# Patient Record
Sex: MALE | Race: OTHER | HISPANIC OR LATINO
[De-identification: names, ages, dates, MRNs, and addresses within clinical notes are randomized per-mention and may not be internally consistent; named-entity substitution may affect disease eponyms.]

---

## 2017-01-04 ENCOUNTER — APPOINTMENT (OUTPATIENT)
Dept: ORTHOPEDIC SURGERY | Facility: HOSPITAL | Age: 67
End: 2017-01-04

## 2017-01-11 ENCOUNTER — APPOINTMENT (OUTPATIENT)
Dept: ORTHOPEDIC SURGERY | Facility: HOSPITAL | Age: 67
End: 2017-01-11

## 2017-03-16 ENCOUNTER — APPOINTMENT (OUTPATIENT)
Dept: INTERNAL MEDICINE | Facility: CLINIC | Age: 67
End: 2017-03-16

## 2017-03-27 ENCOUNTER — OUTPATIENT (OUTPATIENT)
Dept: OUTPATIENT SERVICES | Facility: HOSPITAL | Age: 67
LOS: 1 days | End: 2017-03-27
Payer: SELF-PAY

## 2017-03-27 ENCOUNTER — APPOINTMENT (OUTPATIENT)
Age: 67
End: 2017-03-27

## 2017-03-27 VITALS
BODY MASS INDEX: 28.61 KG/M2 | SYSTOLIC BLOOD PRESSURE: 138 MMHG | HEIGHT: 66 IN | TEMPERATURE: 97.7 F | OXYGEN SATURATION: 99 % | WEIGHT: 178 LBS | RESPIRATION RATE: 18 BRPM | HEART RATE: 69 BPM | DIASTOLIC BLOOD PRESSURE: 81 MMHG

## 2017-03-27 DIAGNOSIS — Z00.00 ENCOUNTER FOR GENERAL ADULT MEDICAL EXAMINATION WITHOUT ABNORMAL FINDINGS: ICD-10-CM

## 2017-03-27 PROCEDURE — G0463: CPT

## 2017-03-28 DIAGNOSIS — M25.511 PAIN IN RIGHT SHOULDER: ICD-10-CM

## 2017-03-28 DIAGNOSIS — E78.5 HYPERLIPIDEMIA, UNSPECIFIED: ICD-10-CM

## 2017-03-28 DIAGNOSIS — I87.2 VENOUS INSUFFICIENCY (CHRONIC) (PERIPHERAL): ICD-10-CM

## 2017-04-03 ENCOUNTER — APPOINTMENT (OUTPATIENT)
Dept: SURGERY | Facility: CLINIC | Age: 67
End: 2017-04-03

## 2017-04-03 ENCOUNTER — OUTPATIENT (OUTPATIENT)
Dept: OUTPATIENT SERVICES | Facility: HOSPITAL | Age: 67
LOS: 1 days | End: 2017-04-03
Payer: SELF-PAY

## 2017-04-03 VITALS
OXYGEN SATURATION: 99 % | TEMPERATURE: 98 F | WEIGHT: 178 LBS | DIASTOLIC BLOOD PRESSURE: 79 MMHG | HEIGHT: 66 IN | RESPIRATION RATE: 17 BRPM | SYSTOLIC BLOOD PRESSURE: 130 MMHG | BODY MASS INDEX: 28.61 KG/M2 | HEART RATE: 88 BPM

## 2017-04-03 DIAGNOSIS — I87.2 VENOUS INSUFFICIENCY (CHRONIC) (PERIPHERAL): ICD-10-CM

## 2017-04-03 DIAGNOSIS — R21 RASH AND OTHER NONSPECIFIC SKIN ERUPTION: ICD-10-CM

## 2017-04-03 DIAGNOSIS — Z78.9 OTHER SPECIFIED HEALTH STATUS: ICD-10-CM

## 2017-04-03 DIAGNOSIS — Z00.00 ENCOUNTER FOR GENERAL ADULT MEDICAL EXAMINATION WITHOUT ABNORMAL FINDINGS: ICD-10-CM

## 2017-04-03 DIAGNOSIS — I83.10 VARICOSE VEINS OF UNSPECIFIED LOWER EXTREMITY WITH INFLAMMATION: ICD-10-CM

## 2017-04-03 PROCEDURE — G0463: CPT

## 2017-07-20 ENCOUNTER — APPOINTMENT (OUTPATIENT)
Dept: INTERNAL MEDICINE | Facility: CLINIC | Age: 67
End: 2017-07-20

## 2017-07-20 ENCOUNTER — OUTPATIENT (OUTPATIENT)
Dept: OUTPATIENT SERVICES | Facility: HOSPITAL | Age: 67
LOS: 1 days | End: 2017-07-20
Payer: SELF-PAY

## 2017-07-20 VITALS
TEMPERATURE: 98.5 F | HEART RATE: 87 BPM | SYSTOLIC BLOOD PRESSURE: 124 MMHG | RESPIRATION RATE: 16 BRPM | DIASTOLIC BLOOD PRESSURE: 75 MMHG | BODY MASS INDEX: 28.61 KG/M2 | HEIGHT: 66 IN | OXYGEN SATURATION: 99 % | WEIGHT: 178 LBS

## 2017-07-20 DIAGNOSIS — N50.82 SCROTAL PAIN: ICD-10-CM

## 2017-07-20 DIAGNOSIS — Z00.00 ENCOUNTER FOR GENERAL ADULT MEDICAL EXAMINATION WITHOUT ABNORMAL FINDINGS: ICD-10-CM

## 2017-07-20 LAB
ALBUMIN SERPL ELPH-MCNC: 4.8 G/DL — SIGNIFICANT CHANGE UP (ref 3.3–5)
ALP SERPL-CCNC: 82 U/L — SIGNIFICANT CHANGE UP (ref 40–120)
ALT FLD-CCNC: 23 U/L — SIGNIFICANT CHANGE UP (ref 10–45)
ANION GAP SERPL CALC-SCNC: 12 MMOL/L — SIGNIFICANT CHANGE UP (ref 5–17)
APPEARANCE UR: CLEAR — SIGNIFICANT CHANGE UP
AST SERPL-CCNC: 22 U/L — SIGNIFICANT CHANGE UP (ref 10–40)
BASOPHILS # BLD AUTO: 0.05 K/UL — SIGNIFICANT CHANGE UP (ref 0–0.2)
BASOPHILS NFR BLD AUTO: 0.6 % — SIGNIFICANT CHANGE UP (ref 0–2)
BILIRUB SERPL-MCNC: 0.5 MG/DL — SIGNIFICANT CHANGE UP (ref 0.2–1.2)
BILIRUB UR-MCNC: NEGATIVE — SIGNIFICANT CHANGE UP
BUN SERPL-MCNC: 29 MG/DL — HIGH (ref 7–23)
CALCIUM SERPL-MCNC: 10.2 MG/DL — SIGNIFICANT CHANGE UP (ref 8.4–10.5)
CHLORIDE SERPL-SCNC: 103 MMOL/L — SIGNIFICANT CHANGE UP (ref 96–108)
CHOLEST SERPL-MCNC: 178 MG/DL — SIGNIFICANT CHANGE UP (ref 10–199)
CO2 SERPL-SCNC: 27 MMOL/L — SIGNIFICANT CHANGE UP (ref 22–31)
COLOR SPEC: YELLOW — SIGNIFICANT CHANGE UP
CREAT ?TM UR-MCNC: 190 MG/DL — SIGNIFICANT CHANGE UP
CREAT SERPL-MCNC: 1.03 MG/DL — SIGNIFICANT CHANGE UP (ref 0.5–1.3)
DIFF PNL FLD: NEGATIVE — SIGNIFICANT CHANGE UP
EOSINOPHIL # BLD AUTO: 0.09 K/UL — SIGNIFICANT CHANGE UP (ref 0–0.5)
EOSINOPHIL NFR BLD AUTO: 1.1 % — SIGNIFICANT CHANGE UP (ref 0–6)
ERYTHROCYTE [SEDIMENTATION RATE] IN BLOOD: 17 MM/HR — SIGNIFICANT CHANGE UP (ref 0–20)
FOLATE SERPL-MCNC: >20 NG/ML — SIGNIFICANT CHANGE UP (ref 4.8–24.2)
GGT SERPL-CCNC: 38 U/L — SIGNIFICANT CHANGE UP (ref 9–50)
GLUCOSE SERPL-MCNC: 93 MG/DL — SIGNIFICANT CHANGE UP (ref 70–99)
GLUCOSE UR QL: NEGATIVE MG/DL — SIGNIFICANT CHANGE UP
HBA1C BLD-MCNC: 5.6 % — SIGNIFICANT CHANGE UP (ref 4–5.6)
HCT VFR BLD CALC: 42.8 % — SIGNIFICANT CHANGE UP (ref 39–50)
HDLC SERPL-MCNC: 50 MG/DL — SIGNIFICANT CHANGE UP (ref 40–125)
HGB BLD-MCNC: 13.9 G/DL — SIGNIFICANT CHANGE UP (ref 13–17)
IMM GRANULOCYTES NFR BLD AUTO: 0.2 % — SIGNIFICANT CHANGE UP (ref 0–1.5)
IRON SATN MFR SERPL: 109 UG/DL — SIGNIFICANT CHANGE UP (ref 45–165)
IRON SATN MFR SERPL: 39 % — SIGNIFICANT CHANGE UP (ref 16–55)
KETONES UR-MCNC: NEGATIVE — SIGNIFICANT CHANGE UP
LEUKOCYTE ESTERASE UR-ACNC: NEGATIVE — SIGNIFICANT CHANGE UP
LIPID PNL WITH DIRECT LDL SERPL: 104 MG/DL — SIGNIFICANT CHANGE UP
LYMPHOCYTES # BLD AUTO: 1.96 K/UL — SIGNIFICANT CHANGE UP (ref 1–3.3)
LYMPHOCYTES # BLD AUTO: 23.6 % — SIGNIFICANT CHANGE UP (ref 13–44)
MCHC RBC-ENTMCNC: 29.3 PG — SIGNIFICANT CHANGE UP (ref 27–34)
MCHC RBC-ENTMCNC: 32.5 GM/DL — SIGNIFICANT CHANGE UP (ref 32–36)
MCV RBC AUTO: 90.3 FL — SIGNIFICANT CHANGE UP (ref 80–100)
MICROALBUMIN UR-MCNC: 1.2 MG/DL — SIGNIFICANT CHANGE UP
MICROALBUMIN/CREAT UR-RTO: 6 MG/G — SIGNIFICANT CHANGE UP (ref 0–30)
MONOCYTES # BLD AUTO: 0.48 K/UL — SIGNIFICANT CHANGE UP (ref 0–0.9)
MONOCYTES NFR BLD AUTO: 5.8 % — SIGNIFICANT CHANGE UP (ref 2–14)
NEUTROPHILS # BLD AUTO: 5.71 K/UL — SIGNIFICANT CHANGE UP (ref 1.8–7.4)
NEUTROPHILS NFR BLD AUTO: 68.7 % — SIGNIFICANT CHANGE UP (ref 43–77)
NITRITE UR-MCNC: NEGATIVE — SIGNIFICANT CHANGE UP
PH UR: 6 — SIGNIFICANT CHANGE UP (ref 5–8)
PLATELET # BLD AUTO: 157 K/UL — SIGNIFICANT CHANGE UP (ref 150–400)
POTASSIUM SERPL-MCNC: 4.2 MMOL/L — SIGNIFICANT CHANGE UP (ref 3.5–5.3)
POTASSIUM SERPL-SCNC: 4.2 MMOL/L — SIGNIFICANT CHANGE UP (ref 3.5–5.3)
PROT SERPL-MCNC: 8 G/DL — SIGNIFICANT CHANGE UP (ref 6–8.3)
PROT UR-MCNC: NEGATIVE MG/DL — SIGNIFICANT CHANGE UP
PSA FLD-MCNC: 4.2 NG/ML — HIGH (ref 0–4)
RBC # BLD: 4.74 M/UL — SIGNIFICANT CHANGE UP (ref 4.2–5.8)
RBC # FLD: 13.4 % — SIGNIFICANT CHANGE UP (ref 10.3–14.5)
SODIUM SERPL-SCNC: 142 MMOL/L — SIGNIFICANT CHANGE UP (ref 135–145)
SP GR SPEC: 1.03 — HIGH (ref 1.01–1.02)
T3 SERPL-MCNC: 122 NG/DL — SIGNIFICANT CHANGE UP (ref 80–200)
T4 FREE SERPL-MCNC: 1 NG/DL — SIGNIFICANT CHANGE UP (ref 0.9–1.8)
TIBC SERPL-MCNC: 278 UG/DL — SIGNIFICANT CHANGE UP (ref 220–430)
TOTAL CHOLESTEROL/HDL RATIO MEASUREMENT: 3.6 RATIO — SIGNIFICANT CHANGE UP (ref 3.4–9.6)
TRIGL SERPL-MCNC: 120 MG/DL — SIGNIFICANT CHANGE UP (ref 10–149)
TSH SERPL-MCNC: 3.59 UIU/ML — SIGNIFICANT CHANGE UP (ref 0.27–4.2)
UIBC SERPL-MCNC: 169 UG/DL — SIGNIFICANT CHANGE UP (ref 110–370)
UROBILINOGEN FLD QL: NEGATIVE MG/DL — SIGNIFICANT CHANGE UP
VIT B12 SERPL-MCNC: 614 PG/ML — SIGNIFICANT CHANGE UP (ref 243–894)
WBC # BLD: 8.31 K/UL — SIGNIFICANT CHANGE UP (ref 3.8–10.5)
WBC # FLD AUTO: 8.31 K/UL — SIGNIFICANT CHANGE UP (ref 3.8–10.5)

## 2017-07-20 PROCEDURE — 83550 IRON BINDING TEST: CPT

## 2017-07-20 PROCEDURE — 80061 LIPID PANEL: CPT

## 2017-07-20 PROCEDURE — 85652 RBC SED RATE AUTOMATED: CPT

## 2017-07-20 PROCEDURE — 84439 ASSAY OF FREE THYROXINE: CPT

## 2017-07-20 PROCEDURE — 80053 COMPREHEN METABOLIC PANEL: CPT

## 2017-07-20 PROCEDURE — 85027 COMPLETE CBC AUTOMATED: CPT

## 2017-07-20 PROCEDURE — 84443 ASSAY THYROID STIM HORMONE: CPT

## 2017-07-20 PROCEDURE — 82746 ASSAY OF FOLIC ACID SERUM: CPT

## 2017-07-20 PROCEDURE — 82977 ASSAY OF GGT: CPT

## 2017-07-20 PROCEDURE — 82306 VITAMIN D 25 HYDROXY: CPT

## 2017-07-20 PROCEDURE — 84480 ASSAY TRIIODOTHYRONINE (T3): CPT

## 2017-07-20 PROCEDURE — G0103: CPT

## 2017-07-20 PROCEDURE — 82043 UR ALBUMIN QUANTITATIVE: CPT

## 2017-07-20 PROCEDURE — 81003 URINALYSIS AUTO W/O SCOPE: CPT

## 2017-07-20 PROCEDURE — 82607 VITAMIN B-12: CPT

## 2017-07-20 PROCEDURE — 83036 HEMOGLOBIN GLYCOSYLATED A1C: CPT

## 2017-07-20 PROCEDURE — G0463: CPT

## 2017-07-21 LAB — 24R-OH-CALCIDIOL SERPL-MCNC: 46.6 NG/ML — SIGNIFICANT CHANGE UP (ref 30–100)

## 2017-07-24 DIAGNOSIS — N50.82 SCROTAL PAIN: ICD-10-CM

## 2017-07-24 DIAGNOSIS — E78.5 HYPERLIPIDEMIA, UNSPECIFIED: ICD-10-CM

## 2017-07-31 ENCOUNTER — OUTPATIENT (OUTPATIENT)
Dept: OUTPATIENT SERVICES | Facility: HOSPITAL | Age: 67
LOS: 1 days | End: 2017-07-31
Payer: SELF-PAY

## 2017-07-31 ENCOUNTER — APPOINTMENT (OUTPATIENT)
Dept: SURGERY | Facility: CLINIC | Age: 67
End: 2017-07-31
Payer: SELF-PAY

## 2017-07-31 VITALS
DIASTOLIC BLOOD PRESSURE: 72 MMHG | BODY MASS INDEX: 28.61 KG/M2 | WEIGHT: 178 LBS | HEIGHT: 66 IN | HEART RATE: 77 BPM | SYSTOLIC BLOOD PRESSURE: 131 MMHG

## 2017-07-31 DIAGNOSIS — Z00.00 ENCOUNTER FOR GENERAL ADULT MEDICAL EXAMINATION WITHOUT ABNORMAL FINDINGS: ICD-10-CM

## 2017-07-31 DIAGNOSIS — R21 RASH AND OTHER NONSPECIFIC SKIN ERUPTION: ICD-10-CM

## 2017-07-31 PROCEDURE — G0463: CPT

## 2017-07-31 PROCEDURE — 99212 OFFICE O/P EST SF 10 MIN: CPT

## 2017-08-02 DIAGNOSIS — I87.2 VENOUS INSUFFICIENCY (CHRONIC) (PERIPHERAL): ICD-10-CM

## 2017-08-24 ENCOUNTER — FORM ENCOUNTER (OUTPATIENT)
Age: 67
End: 2017-08-24

## 2017-08-25 ENCOUNTER — APPOINTMENT (OUTPATIENT)
Dept: ULTRASOUND IMAGING | Facility: HOSPITAL | Age: 67
End: 2017-08-25
Payer: SELF-PAY

## 2017-08-25 ENCOUNTER — OUTPATIENT (OUTPATIENT)
Dept: OUTPATIENT SERVICES | Facility: HOSPITAL | Age: 67
LOS: 1 days | End: 2017-08-25
Payer: SELF-PAY

## 2017-08-25 DIAGNOSIS — N50.82 SCROTAL PAIN: ICD-10-CM

## 2017-08-25 PROCEDURE — 76870 US EXAM SCROTUM: CPT

## 2017-08-25 PROCEDURE — 76870 US EXAM SCROTUM: CPT | Mod: 26

## 2020-02-07 ENCOUNTER — EMERGENCY (EMERGENCY)
Facility: HOSPITAL | Age: 70
LOS: 1 days | Discharge: HOME | End: 2020-02-07
Attending: EMERGENCY MEDICINE
Payer: MEDICARE

## 2020-02-07 VITALS
RESPIRATION RATE: 16 BRPM | OXYGEN SATURATION: 98 % | WEIGHT: 171.96 LBS | DIASTOLIC BLOOD PRESSURE: 89 MMHG | HEIGHT: 68 IN | SYSTOLIC BLOOD PRESSURE: 158 MMHG | HEART RATE: 79 BPM | TEMPERATURE: 98 F

## 2020-02-07 VITALS
OXYGEN SATURATION: 98 % | RESPIRATION RATE: 18 BRPM | TEMPERATURE: 98 F | DIASTOLIC BLOOD PRESSURE: 84 MMHG | SYSTOLIC BLOOD PRESSURE: 134 MMHG | HEART RATE: 80 BPM

## 2020-02-07 DIAGNOSIS — Z90.49 ACQUIRED ABSENCE OF OTHER SPECIFIED PARTS OF DIGESTIVE TRACT: Chronic | ICD-10-CM

## 2020-02-07 PROCEDURE — 99283 EMERGENCY DEPT VISIT LOW MDM: CPT

## 2020-02-07 RX ORDER — LIDOCAINE 4 G/100G
1 CREAM TOPICAL ONCE
Refills: 0 | Status: COMPLETED | OUTPATIENT
Start: 2020-02-07 | End: 2020-02-07

## 2020-02-07 RX ORDER — METHOCARBAMOL 500 MG/1
2 TABLET, FILM COATED ORAL
Qty: 30 | Refills: 0
Start: 2020-02-07 | End: 2020-02-11

## 2020-02-07 RX ORDER — IBUPROFEN 200 MG
400 TABLET ORAL ONCE
Refills: 0 | Status: DISCONTINUED | OUTPATIENT
Start: 2020-02-07 | End: 2020-02-07

## 2020-02-07 RX ORDER — IBUPROFEN 200 MG
1 TABLET ORAL
Qty: 56 | Refills: 0
Start: 2020-02-07 | End: 2020-02-20

## 2020-02-07 RX ORDER — ACETAMINOPHEN 500 MG
975 TABLET ORAL ONCE
Refills: 0 | Status: COMPLETED | OUTPATIENT
Start: 2020-02-07 | End: 2020-02-07

## 2020-02-07 RX ORDER — IBUPROFEN 200 MG
600 TABLET ORAL ONCE
Refills: 0 | Status: COMPLETED | OUTPATIENT
Start: 2020-02-07 | End: 2020-02-07

## 2020-02-07 RX ORDER — METHOCARBAMOL 500 MG/1
1500 TABLET, FILM COATED ORAL ONCE
Refills: 0 | Status: COMPLETED | OUTPATIENT
Start: 2020-02-07 | End: 2020-02-07

## 2020-02-07 RX ADMIN — METHOCARBAMOL 1500 MILLIGRAM(S): 500 TABLET, FILM COATED ORAL at 21:22

## 2020-02-07 RX ADMIN — Medication 975 MILLIGRAM(S): at 21:22

## 2020-02-07 RX ADMIN — Medication 600 MILLIGRAM(S): at 21:22

## 2020-02-07 RX ADMIN — LIDOCAINE 1 PATCH: 4 CREAM TOPICAL at 21:21

## 2020-02-07 NOTE — ED PROVIDER NOTE - CLINICAL SUMMARY MEDICAL DECISION MAKING FREE TEXT BOX
Patient presenting with right rib and lower abdominal pain after trauma to the area 3 weeks ago when bending over a pole. Suspect musculoskeletal rib pain(absence of any GI, , or infectious symptoms) of with associated muscle strain on the ipsilateral side. Will treat pain aggressively.

## 2020-02-07 NOTE — ED PROVIDER NOTE - ATTENDING CONTRIBUTION TO CARE
I was physically present for the E/M service provided. I agree with above history, physical, and plan which I have reviewed and edited where appropriate. I was physically present for the key portions of the service provided.    Miller: 70M PMH BPH, appendectomy (child), venous stasis presents with right rib pain and right lower abdominal pain/flank pain x 3 wks after leaning over a pole. pt hit affected side and since then unable to lie on that side.  pain improve with advil.  movement and lying on affected side makes it worse. no fever, no dysuria, no n/v, normal bm.      ttp at right lateral abd no crepitus, no abd ttp    a/p: muscle strain vs contusion.  robaxin, motrin, tylenol, lidoderm.

## 2020-02-07 NOTE — ED PROVIDER NOTE - OBJECTIVE STATEMENT
70M PMH BPH who presents with rib pain and right lower abdominal pain. He reports that three weeks ago he was bent over a pole and he believes that is how he injured his rib. His abdominal pain started at that same time. There are no assoca 70M PMH BPH who presents with right rib pain and right lower abdominal pain. He reports that three weeks ago he was bent over a pole and he believes that is how he injured his rib. His abdominal pain started at that same time. He has never had similar symptoms. Denies any fever, chills, n/v/d, cp, palpitations, sob, dysuria, urinary frequency(more than baseline related to BPH), changes in bowel habits or frequency . He used some type of patch for relief but got a rash. Of note, had appendix removed as a child.

## 2020-02-07 NOTE — ED PROVIDER NOTE - GASTROINTESTINAL, MLM
abdomen soft, nondistended. Mild tenderness with deep palpation of right lower abdomen, no rebound or guarding

## 2020-02-07 NOTE — ED PROVIDER NOTE - PATIENT PORTAL LINK FT
You can access the FollowMyHealth Patient Portal offered by [facility] by registering at the following website: [email or web address]. By joining [employer] [facility]’s FollowMyHealth portal, you will also be able to view your health information using other applications (apps) compatible with our system.

## 2020-02-27 PROBLEM — N40.0 BENIGN PROSTATIC HYPERPLASIA WITHOUT LOWER URINARY TRACT SYMPTOMS: Chronic | Status: ACTIVE | Noted: 2020-02-07

## 2020-03-02 ENCOUNTER — APPOINTMENT (OUTPATIENT)
Dept: SURGERY | Facility: CLINIC | Age: 70
End: 2020-03-02
Payer: MEDICARE

## 2020-03-02 VITALS
SYSTOLIC BLOOD PRESSURE: 149 MMHG | TEMPERATURE: 98.6 F | HEIGHT: 66 IN | HEART RATE: 101 BPM | DIASTOLIC BLOOD PRESSURE: 84 MMHG

## 2020-03-02 PROCEDURE — 99214 OFFICE O/P EST MOD 30 MIN: CPT

## 2020-03-02 RX ORDER — ATORVASTATIN CALCIUM 80 MG/1
TABLET, FILM COATED ORAL
Refills: 0 | Status: ACTIVE | COMMUNITY

## 2020-03-03 NOTE — CONSULT LETTER
[Consult Letter:] : I had the pleasure of evaluating your patient, [unfilled]. [Dear  ___] : Dear  [unfilled], [Consult Closing:] : Thank you very much for allowing me to participate in the care of this patient.  If you have any questions, please do not hesitate to contact me. [Please see my note below.] : Please see my note below. [Sincerely,] : Sincerely, [DrErum  ___] : Dr. ANGELO [FreeTextEntry3] : Yosi Esparza MD, FACS

## 2020-03-03 NOTE — PLAN
[FreeTextEntry1] : Mr. CATALAN  was told significance of findings, options, risks and benefits were explained.  Patient has BL inguinal hernias. LIH is slightly larger than the right and it could be because of the recent injury.   All surgical options were discussed including non-surgical treatments.  patient was advised to return for evaluation in 1 month after his ecchymosis has completely resolved. \par Patient advised to seek immediate medical attention with any acute change in symptoms or with the development of any new or worsening symptoms.  Patient's questions and concerns addressed to patient's satisfaction, and patient verbalized an understanding of the information discussed.\par \par

## 2020-03-03 NOTE — HISTORY OF PRESENT ILLNESS
[de-identified] : Mr. KARLENE CATALAN is  a 70 year old male who was referred by Dr. Manuela Collins [phone]  and Dr Elvis Whitney  with the chief complaint of having BL groin pain   He has had the condition for 6 months and is worse when he is walking or standing.  He is stating that the hernia is getting  more symptomatic. He denies any fever or  night sweats. Appetite is good and weight is stable.    Patient states he had a fall at work  recently and had a left flank and left hip injury and has a  ecchymosis. \par  \par

## 2020-05-14 ENCOUNTER — APPOINTMENT (OUTPATIENT)
Dept: SURGERY | Facility: CLINIC | Age: 70
End: 2020-05-14

## 2020-07-06 ENCOUNTER — APPOINTMENT (OUTPATIENT)
Dept: SURGERY | Facility: CLINIC | Age: 70
End: 2020-07-06
Payer: MEDICARE

## 2020-07-06 VITALS
HEIGHT: 66 IN | WEIGHT: 180.13 LBS | SYSTOLIC BLOOD PRESSURE: 156 MMHG | HEART RATE: 82 BPM | TEMPERATURE: 96.9 F | DIASTOLIC BLOOD PRESSURE: 93 MMHG | BODY MASS INDEX: 28.95 KG/M2 | OXYGEN SATURATION: 96 %

## 2020-07-06 DIAGNOSIS — Z87.19 PERSONAL HISTORY OF OTHER DISEASES OF THE DIGESTIVE SYSTEM: ICD-10-CM

## 2020-07-06 DIAGNOSIS — Z83.3 FAMILY HISTORY OF DIABETES MELLITUS: ICD-10-CM

## 2020-07-06 DIAGNOSIS — K80.20 CALCULUS OF GALLBLADDER W/OUT CHOLECYSTITIS W/OUT OBSTRUCTION: ICD-10-CM

## 2020-07-06 DIAGNOSIS — R10.31 RIGHT LOWER QUADRANT PAIN: ICD-10-CM

## 2020-07-06 DIAGNOSIS — Z87.438 PERSONAL HISTORY OF OTHER DISEASES OF MALE GENITAL ORGANS: ICD-10-CM

## 2020-07-06 DIAGNOSIS — Z87.442 PERSONAL HISTORY OF URINARY CALCULI: ICD-10-CM

## 2020-07-06 DIAGNOSIS — Z87.891 PERSONAL HISTORY OF NICOTINE DEPENDENCE: ICD-10-CM

## 2020-07-06 DIAGNOSIS — Z78.9 OTHER SPECIFIED HEALTH STATUS: ICD-10-CM

## 2020-07-06 DIAGNOSIS — Z82.49 FAMILY HISTORY OF ISCHEMIC HEART DISEASE AND OTHER DISEASES OF THE CIRCULATORY SYSTEM: ICD-10-CM

## 2020-07-06 DIAGNOSIS — Z80.0 FAMILY HISTORY OF MALIGNANT NEOPLASM OF DIGESTIVE ORGANS: ICD-10-CM

## 2020-07-06 DIAGNOSIS — R10.32 RIGHT LOWER QUADRANT PAIN: ICD-10-CM

## 2020-07-06 DIAGNOSIS — K40.20 BILATERAL INGUINAL HERNIA, W/OUT OBSTRUCTION OR GANGRENE, NOT SPECIFIED AS RECURRENT: ICD-10-CM

## 2020-07-06 PROCEDURE — 99214 OFFICE O/P EST MOD 30 MIN: CPT

## 2020-07-06 NOTE — HISTORY OF PRESENT ILLNESS
[de-identified] : Mr. Mercado presented today for evaluation and management of bilateral groin pain.  He stated the pain is worse on the right than the left.  He rated the pain an 8/10.  He stated the pain sometimes feels like a "pulling" sensation from the right lower quadrant into the groin.  He denied any bulges in the groins.

## 2020-07-06 NOTE — REVIEW OF SYSTEMS
[Abdominal Pain] : abdominal pain [Incontinence] : incontinence [Joint Pain] : joint pain [Negative] : Heme/Lymph [Vomiting] : no vomiting [Constipation] : no constipation [Diarrhea] : no diarrhea [Heartburn] : no heartburn [Melena] : no melena [Dysuria] : no dysuria [Nocturia] : no nocturia [Hesitancy] : no urinary hesitancy [Genital Lesion] : no genital lesions [Joint Swelling] : no joint swelling [Arthralgias] : no arthralgias [Testicular Pain] : no testicular pain [Limb Pain] : no limb pain [Joint Stiffness] : no joint stiffness [Limb Swelling] : no limb swelling [FreeTextEntry8] : pelvic pain

## 2020-07-06 NOTE — REASON FOR VISIT
[Consultation] : a consultation visit [Family Member] : family member [FreeTextEntry1] : Consultation requested by: Dr. Dennis Roy

## 2020-07-06 NOTE — PHYSICAL EXAM
[Calm] : calm [de-identified] : NAD, comfortable [de-identified] : NCAT, no scleral icterus [de-identified] : CTAB [de-identified] : Supple, no JVD or cervical lymphadenopathy [de-identified] : +BS soft NT ND.  No hepatosplenomegaly.  Well-healed paramidline incision to the patient's right of the umbilicus from his prior appendectomy. [de-identified] : S1S2 normal, RRR [de-identified] : No definite inguinal hernia defect identified bilaterally. [de-identified] : Warm, dry. [de-identified] : No clubbing, cyanosis, or edema. [de-identified] : A&Ox3

## 2020-07-06 NOTE — CONSULT LETTER
[FreeTextEntry1] : 2020\par \par \par \par Dennis Roy M.D.\par [address]\[place] [address]\par Telephone #: [phone]\par \par \par Re: Arley Mercado\par : 1950\par \par \par Dear Dr. Roy:\par \par I had the opportunity to see Mr. Mercado today for evaluation and management of bilateral groin pain.  He stated the pain is worse on the right than the left.  He rated the pain an 8/10.  He stated the pain sometimes feels like a "pulling" sensation from the right lower quadrant into the groin.  He denied any bulges in the groins.\par \par On physical examination, his height is 5 feet 6 inches, his weight is 180 pounds, and BMI is 29.07.  His temperature is 96.9 °F, his blood pressure is 156/93, heart rate is 82, and O2 saturation is 96% on room air.  In general, he is a well-dressed, well-nourished man who appears his stated age and is in no acute distress.  He is calm, alert and oriented x 3. HEENT exam demonstrates no scleral icterus and a normocephalic atraumatic appearance.  Neck is supple without JVD or cervical lymphadenopathy appreciated.  Lungs are clear to auscultation bilaterally.  Heart sounds S1S2 are normal with regular rate and rhythm.  His abdomen has audible bowel sounds, is soft, non-tender, and non-distended.  There is no hepatosplenomegaly.  There is a well-healed paramidline incision to the patient's right of the umbilicus from his prior appendectomy.  His extremities are warm and dry with no evidence of clubbing, cyanosis, or edema.  Bilateral groin examination demonstrates no definite inguinal hernia.\par \par I reviewed the ultrasound of the testicles that was performed on 2017, which demonstrated no testicular torsion.  Small bilateral hydroceles.\par \par In summary, Mr. Mercado is a 70-year-old man with bilateral groin pain without definite inguinal hernias on examination.  We will perform an ultrasound of the pelvis to evaluate for hernias.\par \par Thank you for the opportunity to care for this patient.  Please do not hesitate to contact me in the event that you have any questions or concerns about the care of this patient.\par \par Sincerely,\par \par \par \par \par Josefina Gonzalez M.D.\par \par CC:\par Kenney Hernández M.D.\par [facility]\par [address]\[place] [address]\par Telephone #: [phone]

## 2020-07-06 NOTE — ASSESSMENT
[FreeTextEntry1] : Mr. Mercado is a 70-year-old man with bilateral groin pain without definite inguinal hernias on examination.  We will perform an ultrasound of the pelvis to evaluate for hernias.

## 2020-07-19 ENCOUNTER — OUTPATIENT (OUTPATIENT)
Dept: OUTPATIENT SERVICES | Facility: HOSPITAL | Age: 70
LOS: 1 days | End: 2020-07-19
Payer: MEDICARE

## 2020-07-19 ENCOUNTER — APPOINTMENT (OUTPATIENT)
Dept: ULTRASOUND IMAGING | Facility: HOSPITAL | Age: 70
End: 2020-07-19

## 2020-07-19 DIAGNOSIS — Z90.49 ACQUIRED ABSENCE OF OTHER SPECIFIED PARTS OF DIGESTIVE TRACT: Chronic | ICD-10-CM

## 2020-07-19 PROCEDURE — 76705 ECHO EXAM OF ABDOMEN: CPT | Mod: 26

## 2020-07-19 PROCEDURE — 76857 US EXAM PELVIC LIMITED: CPT

## 2020-07-19 PROCEDURE — 76857 US EXAM PELVIC LIMITED: CPT | Mod: 26

## 2020-07-19 PROCEDURE — 76705 ECHO EXAM OF ABDOMEN: CPT

## 2020-08-24 ENCOUNTER — APPOINTMENT (OUTPATIENT)
Dept: DISASTER EMERGENCY | Facility: CLINIC | Age: 70
End: 2020-08-24

## 2020-08-25 LAB — SARS-COV-2 N GENE NPH QL NAA+PROBE: NOT DETECTED

## 2020-09-16 ENCOUNTER — APPOINTMENT (OUTPATIENT)
Dept: HEART AND VASCULAR | Facility: CLINIC | Age: 70
End: 2020-09-16
Payer: MEDICARE

## 2020-09-16 VITALS
SYSTOLIC BLOOD PRESSURE: 126 MMHG | OXYGEN SATURATION: 98 % | DIASTOLIC BLOOD PRESSURE: 80 MMHG | BODY MASS INDEX: 29.05 KG/M2 | TEMPERATURE: 97.5 F | WEIGHT: 180 LBS

## 2020-09-16 DIAGNOSIS — I83.90 ASYMPTOMATIC VARICOSE VEINS OF UNSPECIFIED LOWER EXTREMITY: ICD-10-CM

## 2020-09-16 PROCEDURE — 93000 ELECTROCARDIOGRAM COMPLETE: CPT

## 2020-09-16 PROCEDURE — 99204 OFFICE O/P NEW MOD 45 MIN: CPT

## 2020-09-16 RX ORDER — ASPIRIN 325 MG/1
TABLET, FILM COATED ORAL
Refills: 0 | Status: DISCONTINUED | COMMUNITY
End: 2020-09-16

## 2020-09-16 NOTE — HISTORY OF PRESENT ILLNESS
[FreeTextEntry1] : sent by pcp for cardiac evaluation\par since Lockdown has noted increase fatigue\par eoisides with stairs and hills\par vague chest heaviness, non radiationg\par mild\par better rest, last less than 1 minute\par +sob

## 2020-09-16 NOTE — PHYSICAL EXAM
[General Appearance - Well Developed] : well developed [Normal Appearance] : normal appearance [Well Groomed] : well groomed [General Appearance - Well Nourished] : well nourished [No Deformities] : no deformities [General Appearance - In No Acute Distress] : no acute distress [Eyelids - No Xanthelasma] : the eyelids demonstrated no xanthelasmas [Normal Conjunctiva] : the conjunctiva exhibited no abnormalities [Normal Oral Mucosa] : normal oral mucosa [No Oral Cyanosis] : no oral cyanosis [No Oral Pallor] : no oral pallor [Normal Jugular Venous A Waves Present] : normal jugular venous A waves present [Normal Jugular Venous V Waves Present] : normal jugular venous V waves present [No Jugular Venous Connors A Waves] : no jugular venous connors A waves [Respiration, Rhythm And Depth] : normal respiratory rhythm and effort [Exaggerated Use Of Accessory Muscles For Inspiration] : no accessory muscle use [Auscultation Breath Sounds / Voice Sounds] : lungs were clear to auscultation bilaterally [Heart Sounds] : normal S1 and S2 [Heart Rate And Rhythm] : heart rate and rhythm were normal [Murmurs] : no murmurs present [Abdomen Tenderness] : non-tender [Abdomen Soft] : soft [Abdomen Mass (___ Cm)] : no abdominal mass palpated [Abnormal Walk] : normal gait [Cyanosis, Localized] : no localized cyanosis [Nail Clubbing] : no clubbing of the fingernails [Gait - Sufficient For Exercise Testing] : the gait was sufficient for exercise testing [Petechial Hemorrhages (___cm)] : no petechial hemorrhages [] : no ischemic changes [FreeTextEntry1] : varicosities L + stasis [Oriented To Time, Place, And Person] : oriented to person, place, and time [Affect] : the affect was normal [Mood] : the mood was normal [No Anxiety] : not feeling anxious

## 2020-09-24 ENCOUNTER — APPOINTMENT (OUTPATIENT)
Dept: HEART AND VASCULAR | Facility: CLINIC | Age: 70
End: 2020-09-24
Payer: MEDICARE

## 2020-09-24 DIAGNOSIS — E78.5 HYPERLIPIDEMIA, UNSPECIFIED: ICD-10-CM

## 2020-09-24 DIAGNOSIS — R07.9 CHEST PAIN, UNSPECIFIED: ICD-10-CM

## 2020-09-24 DIAGNOSIS — R06.02 SHORTNESS OF BREATH: ICD-10-CM

## 2020-09-24 PROCEDURE — 93306 TTE W/DOPPLER COMPLETE: CPT

## 2020-09-24 PROCEDURE — 99214 OFFICE O/P EST MOD 30 MIN: CPT | Mod: 25

## 2020-09-24 PROCEDURE — 78451 HT MUSCLE IMAGE SPECT SING: CPT

## 2020-09-24 PROCEDURE — 93015 CV STRESS TEST SUPVJ I&R: CPT

## 2020-09-24 PROCEDURE — A9500: CPT

## 2020-09-24 NOTE — PHYSICAL EXAM
[General Appearance - Well Developed] : well developed [Normal Appearance] : normal appearance [Well Groomed] : well groomed [General Appearance - Well Nourished] : well nourished [No Deformities] : no deformities [General Appearance - In No Acute Distress] : no acute distress [Normal Conjunctiva] : the conjunctiva exhibited no abnormalities [Eyelids - No Xanthelasma] : the eyelids demonstrated no xanthelasmas [Normal Oral Mucosa] : normal oral mucosa [No Oral Pallor] : no oral pallor [No Oral Cyanosis] : no oral cyanosis [Normal Jugular Venous A Waves Present] : normal jugular venous A waves present [Normal Jugular Venous V Waves Present] : normal jugular venous V waves present [No Jugular Venous Connors A Waves] : no jugular venous connors A waves [Respiration, Rhythm And Depth] : normal respiratory rhythm and effort [Exaggerated Use Of Accessory Muscles For Inspiration] : no accessory muscle use [Auscultation Breath Sounds / Voice Sounds] : lungs were clear to auscultation bilaterally [Heart Rate And Rhythm] : heart rate and rhythm were normal [Heart Sounds] : normal S1 and S2 [Murmurs] : no murmurs present [Abdomen Soft] : soft [Abdomen Tenderness] : non-tender [Abdomen Mass (___ Cm)] : no abdominal mass palpated [Abnormal Walk] : normal gait [Gait - Sufficient For Exercise Testing] : the gait was sufficient for exercise testing [Nail Clubbing] : no clubbing of the fingernails [Cyanosis, Localized] : no localized cyanosis [Petechial Hemorrhages (___cm)] : no petechial hemorrhages [] : no ischemic changes [FreeTextEntry1] : varicosities L + stasis [Oriented To Time, Place, And Person] : oriented to person, place, and time [Affect] : the affect was normal [Mood] : the mood was normal [No Anxiety] : not feeling anxious

## 2020-09-24 NOTE — DISCUSSION/SUMMARY
[FreeTextEntry1] : CP/SOB- negative non invasive cardiac evaluation, results discussed, reassurance\par Lipids- cont statin

## 2020-10-16 ENCOUNTER — APPOINTMENT (OUTPATIENT)
Dept: VASCULAR SURGERY | Facility: CLINIC | Age: 70
End: 2020-10-16
Payer: MEDICARE

## 2020-10-16 DIAGNOSIS — I87.2 VENOUS INSUFFICIENCY (CHRONIC) (PERIPHERAL): ICD-10-CM

## 2020-10-16 PROCEDURE — 93971 EXTREMITY STUDY: CPT

## 2020-10-16 PROCEDURE — 99203 OFFICE O/P NEW LOW 30 MIN: CPT

## 2020-10-16 NOTE — ASSESSMENT
[FreeTextEntry1] : 69 y/o M presents for initial evaluation of LLE varicose veins. On exam, LLE: Bulging varicose veins over the medial calf. LLE venous doppler done at the office today: Negative DVT/SVT, deep reflux CFV - Femoral vein- POP vein. SSV reflux at SPJ to mid calf. Discussed LLE RFA followed by LLE stab phlebectomy to which pt agrees and accepts. The risks, benefits, convalescence and alternatives were reviewed. Numerous questions were asked and answered. Preop medical clearance.Surgery will be scheduled at a convenient time. In the interim, we will seek authorization from insurance, once approved we will proceed accordingly.

## 2020-10-16 NOTE — PROCEDURE
[FreeTextEntry1] : LLE venous doppler done at the office today: Negative DVT/SVT, deep reflux CFV - Femoral vein- POP vein. SSV reflux at SPJ to mid calf

## 2020-10-16 NOTE — HISTORY OF PRESENT ILLNESS
[FreeTextEntry1] :  70 year old male presenting to the office for evaluation of LL varicose veins. Patient noticed swelling, heaviness and achiness on the leg. Patient has been wearing compression stockings with no improvement and elevating legs after work and symptoms do not seem to improve. \par Denies: rest pain, new ulcerations, skin changes.

## 2020-10-16 NOTE — ADDENDUM
[FreeTextEntry1] : This note was written by Evita Flood on 10/16/2020 acting as scribe for Delgado Graves M.D.\par \par I, Delgado Waldron have read and attest that all the information, medical decision making and discharge instructions within are true and accurate.

## 2020-10-16 NOTE — PHYSICAL EXAM
[Respiratory Effort] : normal respiratory effort [Normal Rate and Rhythm] : normal rate and rhythm [2+] : left 2+ [Varicose Veins Of Lower Extremities] : present [Varicose Veins Of The Left Leg] : of the left leg [Ankle Swelling On The Left] : moderate [Alert] : alert [Oriented to Place] : oriented to place [Oriented to Person] : oriented to person [Oriented to Time] : oriented to time [Calm] : calm [] : not present [Ankle Swelling (On Exam)] : not present [Abdomen Tenderness] : ~T ~M No abdominal tenderness [de-identified] : Calm, cooperative [de-identified] : Supple [de-identified] : NC/AT [de-identified] : FROM [de-identified] : LLE: Bulging varicose veins over the medial calf.

## 2020-11-16 ENCOUNTER — APPOINTMENT (OUTPATIENT)
Dept: VASCULAR SURGERY | Facility: CLINIC | Age: 70
End: 2020-11-16
Payer: MEDICARE

## 2020-11-16 PROCEDURE — 99072 ADDL SUPL MATRL&STAF TM PHE: CPT

## 2020-11-16 PROCEDURE — 36475 ENDOVENOUS RF 1ST VEIN: CPT | Mod: LT

## 2020-11-16 NOTE — PROCEDURE
[FreeTextEntry1] : LLE RFA [FreeTextEntry2] : LLE SSV reflux.  [FreeTextEntry3] : Surgeon: Karan Mcgarry MD\par \par Assistant: Neyda Reed RVT\par \par Pre-Op Diagnosis:  Incompetent Left Small Saphenous Vein\par \par Post-Op Diagnosis:  Same\par \par Procedure:  Transcatheter Occlusion of  Left Small Saphenous Vein using a Radio- Frequency Thermal Ablation (VNUS) ClosureFAST Catheter.\par \par Anesthesia: Local \par \par History: Symptomatic varicose veins\par \par Findings:  Complete occlusion of greater saphenous vein at end of procedure.\par \par Procedure: The patient’s leg was prepped and draped.  Under local 1% Lidocaine the greater saphenous vein was punctured below the knee with a micropuncture needle and then the guidewire was inserted into the vein.  This was performed under ultrasound guidance.  The skin was incised with a #11 Blade, a dilator was inserted and then the 7 Fr. Introducer was placed into the greater saphenous vein.  \par \par The fossa ovalis was visualized with the Duplex scan.  The common femoral vein was confirmed to be patent.  Duplex examination of the greater saphenous vein from the calf to the groin was performed.  The greater saphenous and inferior epigastric vein were identified and the VNUS catheter was introduced through the introducer and into the vein up to the fossa ovalis.  It was positioned 3 cm distal to the inferior epigastric vein.\par \par Tumescent solution (400 cc normal saline, 4cc of 8.4% Sodium bicarbonate and 40 cc 1% Lidoaine) was infiltrated subfascially over the vein.  \par \par The VNUS ClosureFAST catheter checked for proper function.  Thermal ablation was begun after the position of the catheter was once again confirmed to be 3 cm distal to the inferior epigastric branch of the greater saphenous vein. The proximal 7 cm was treated twice and then the rest of the vein was treated with single 20 sec cycles. The sheath and catheter were removed. Compression was applied for hemostasis.    \par \par Confirmation of common femoral vein patency and occlusion of the greater saphenous vein was made with duplex ultrasonography.  \par \par The leg was wrapped with gauze and Ace Bandages.  The patient remained on the table until alert and was then comfortable ambulating.           \par \par Patient will return in 2-3 days for repeat ultrasound to make sure GSV is closed and to check for heat induced thrombus.

## 2020-11-16 NOTE — ADDENDUM
[FreeTextEntry1] : This note was written by Evita Flood on 11/16/2020 acting as scribe for Delgado Graves M.D.\par \par I, Delgado Waldron have read and attest that all the information, medical decision making and discharge instructions within are true and accurate.

## 2020-11-20 ENCOUNTER — APPOINTMENT (OUTPATIENT)
Dept: VASCULAR SURGERY | Facility: CLINIC | Age: 70
End: 2020-11-20
Payer: MEDICARE

## 2020-11-20 PROCEDURE — 93971 EXTREMITY STUDY: CPT

## 2020-12-18 ENCOUNTER — APPOINTMENT (OUTPATIENT)
Dept: VASCULAR SURGERY | Facility: CLINIC | Age: 70
End: 2020-12-18
Payer: MEDICARE

## 2020-12-18 PROCEDURE — 99072 ADDL SUPL MATRL&STAF TM PHE: CPT

## 2020-12-18 PROCEDURE — 99213 OFFICE O/P EST LOW 20 MIN: CPT

## 2020-12-21 NOTE — PHYSICAL EXAM
[Respiratory Effort] : normal respiratory effort [Normal Rate and Rhythm] : normal rate and rhythm [2+] : left 2+ [Varicose Veins Of Lower Extremities] : present [Varicose Veins Of The Left Leg] : of the left leg [Ankle Swelling On The Right] : mild [Alert] : alert [Oriented to Person] : oriented to person [Oriented to Place] : oriented to place [Oriented to Time] : oriented to time [Calm] : calm [Ankle Swelling (On Exam)] : not present [] : not present [Abdomen Tenderness] : ~T ~M No abdominal tenderness [de-identified] : Well appearing  [de-identified] : NC/AT  [de-identified] : FROM 5/5 x 4.  [de-identified] : bulging varicose veins over LLE

## 2020-12-21 NOTE — ASSESSMENT
[FreeTextEntry1] : 69 y/o M with LLE SSV reflux s/p RFA presents for follow up evaluation. Pt continues to experience discomfort and bulging varicose veins interfere with his daily activities.  Given residual LLE symptomatic varicose veins, discussed LLE stab phlebectomy to be done at [facility], to which patient has accepted and agreed to proceed. The risks, benefits, convalescence and alternatives were reviewed. Numerous questions were asked and answered to his satisfaction. \par \par Plan: \par - LLE stab phlebectomy at [facility]\par - Will seek authorization from his insurance, once approved will contact patient to schedule procedure.  [Arterial/Venous Disease] : arterial/venous disease

## 2020-12-21 NOTE — ADDENDUM
[FreeTextEntry1] : This note was written by Evita Flood on 12/18/2020 acting as scribe for Delgado Graves M.D.\par \par I, Delgado Waldron have read and attest that all the information, medical decision making and discharge instructions within are true and accurate.

## 2021-01-15 ENCOUNTER — OUTPATIENT (OUTPATIENT)
Dept: OUTPATIENT SERVICES | Facility: HOSPITAL | Age: 71
LOS: 1 days | End: 2021-01-15
Payer: MEDICARE

## 2021-01-15 DIAGNOSIS — Z01.818 ENCOUNTER FOR OTHER PREPROCEDURAL EXAMINATION: ICD-10-CM

## 2021-01-15 DIAGNOSIS — Z90.49 ACQUIRED ABSENCE OF OTHER SPECIFIED PARTS OF DIGESTIVE TRACT: Chronic | ICD-10-CM

## 2021-01-15 LAB
ALBUMIN SERPL ELPH-MCNC: 5 G/DL — SIGNIFICANT CHANGE UP (ref 3.3–5)
ALP SERPL-CCNC: 97 U/L — SIGNIFICANT CHANGE UP (ref 40–120)
ALT FLD-CCNC: 36 U/L — SIGNIFICANT CHANGE UP (ref 10–45)
ANION GAP SERPL CALC-SCNC: 10 MMOL/L — SIGNIFICANT CHANGE UP (ref 5–17)
APTT BLD: 31.2 SEC — SIGNIFICANT CHANGE UP (ref 27.5–35.5)
AST SERPL-CCNC: 23 U/L — SIGNIFICANT CHANGE UP (ref 10–40)
BASOPHILS # BLD AUTO: 0.05 K/UL — SIGNIFICANT CHANGE UP (ref 0–0.2)
BASOPHILS NFR BLD AUTO: 0.7 % — SIGNIFICANT CHANGE UP (ref 0–2)
BILIRUB SERPL-MCNC: 0.7 MG/DL — SIGNIFICANT CHANGE UP (ref 0.2–1.2)
BUN SERPL-MCNC: 17 MG/DL — SIGNIFICANT CHANGE UP (ref 7–23)
CALCIUM SERPL-MCNC: 9.8 MG/DL — SIGNIFICANT CHANGE UP (ref 8.4–10.5)
CHLORIDE SERPL-SCNC: 102 MMOL/L — SIGNIFICANT CHANGE UP (ref 96–108)
CO2 SERPL-SCNC: 31 MMOL/L — SIGNIFICANT CHANGE UP (ref 22–31)
CREAT SERPL-MCNC: 0.91 MG/DL — SIGNIFICANT CHANGE UP (ref 0.5–1.3)
EOSINOPHIL # BLD AUTO: 0.05 K/UL — SIGNIFICANT CHANGE UP (ref 0–0.5)
EOSINOPHIL NFR BLD AUTO: 0.7 % — SIGNIFICANT CHANGE UP (ref 0–6)
GLUCOSE SERPL-MCNC: 106 MG/DL — HIGH (ref 70–99)
HCT VFR BLD CALC: 48.9 % — SIGNIFICANT CHANGE UP (ref 39–50)
HGB BLD-MCNC: 15.8 G/DL — SIGNIFICANT CHANGE UP (ref 13–17)
IMM GRANULOCYTES NFR BLD AUTO: 0.3 % — SIGNIFICANT CHANGE UP (ref 0–1.5)
INR BLD: 1.02 — SIGNIFICANT CHANGE UP (ref 0.88–1.16)
LYMPHOCYTES # BLD AUTO: 2.53 K/UL — SIGNIFICANT CHANGE UP (ref 1–3.3)
LYMPHOCYTES # BLD AUTO: 35.9 % — SIGNIFICANT CHANGE UP (ref 13–44)
MCHC RBC-ENTMCNC: 29.6 PG — SIGNIFICANT CHANGE UP (ref 27–34)
MCHC RBC-ENTMCNC: 32.3 GM/DL — SIGNIFICANT CHANGE UP (ref 32–36)
MCV RBC AUTO: 91.6 FL — SIGNIFICANT CHANGE UP (ref 80–100)
MONOCYTES # BLD AUTO: 0.4 K/UL — SIGNIFICANT CHANGE UP (ref 0–0.9)
MONOCYTES NFR BLD AUTO: 5.7 % — SIGNIFICANT CHANGE UP (ref 2–14)
NEUTROPHILS # BLD AUTO: 3.99 K/UL — SIGNIFICANT CHANGE UP (ref 1.8–7.4)
NEUTROPHILS NFR BLD AUTO: 56.7 % — SIGNIFICANT CHANGE UP (ref 43–77)
NRBC # BLD: 0 /100 WBCS — SIGNIFICANT CHANGE UP (ref 0–0)
PLATELET # BLD AUTO: 154 K/UL — SIGNIFICANT CHANGE UP (ref 150–400)
POTASSIUM SERPL-MCNC: 4.6 MMOL/L — SIGNIFICANT CHANGE UP (ref 3.5–5.3)
POTASSIUM SERPL-SCNC: 4.6 MMOL/L — SIGNIFICANT CHANGE UP (ref 3.5–5.3)
PROT SERPL-MCNC: 7.7 G/DL — SIGNIFICANT CHANGE UP (ref 6–8.3)
PROTHROM AB SERPL-ACNC: 12.2 SEC — SIGNIFICANT CHANGE UP (ref 10.6–13.6)
RBC # BLD: 5.34 M/UL — SIGNIFICANT CHANGE UP (ref 4.2–5.8)
RBC # FLD: 13.1 % — SIGNIFICANT CHANGE UP (ref 10.3–14.5)
SODIUM SERPL-SCNC: 143 MMOL/L — SIGNIFICANT CHANGE UP (ref 135–145)
WBC # BLD: 7.04 K/UL — SIGNIFICANT CHANGE UP (ref 3.8–10.5)
WBC # FLD AUTO: 7.04 K/UL — SIGNIFICANT CHANGE UP (ref 3.8–10.5)

## 2021-01-15 PROCEDURE — 93010 ELECTROCARDIOGRAM REPORT: CPT

## 2021-01-15 PROCEDURE — 85610 PROTHROMBIN TIME: CPT

## 2021-01-15 PROCEDURE — 85025 COMPLETE CBC W/AUTO DIFF WBC: CPT

## 2021-01-15 PROCEDURE — 93005 ELECTROCARDIOGRAM TRACING: CPT

## 2021-01-15 PROCEDURE — 80053 COMPREHEN METABOLIC PANEL: CPT

## 2021-01-15 PROCEDURE — 85730 THROMBOPLASTIN TIME PARTIAL: CPT

## 2021-01-16 ENCOUNTER — APPOINTMENT (OUTPATIENT)
Dept: DISASTER EMERGENCY | Facility: CLINIC | Age: 71
End: 2021-01-16

## 2021-01-16 DIAGNOSIS — Z01.818 ENCOUNTER FOR OTHER PREPROCEDURAL EXAMINATION: ICD-10-CM

## 2021-01-17 LAB — SARS-COV-2 N GENE NPH QL NAA+PROBE: NOT DETECTED

## 2021-01-18 ENCOUNTER — TRANSCRIPTION ENCOUNTER (OUTPATIENT)
Age: 71
End: 2021-01-18

## 2021-01-19 ENCOUNTER — OUTPATIENT (OUTPATIENT)
Dept: OUTPATIENT SERVICES | Facility: HOSPITAL | Age: 71
LOS: 1 days | Discharge: HOME | End: 2021-01-19
Payer: MEDICARE

## 2021-01-19 ENCOUNTER — APPOINTMENT (OUTPATIENT)
Dept: VASCULAR SURGERY | Facility: HOSPITAL | Age: 71
End: 2021-01-19

## 2021-01-19 DIAGNOSIS — Z90.49 ACQUIRED ABSENCE OF OTHER SPECIFIED PARTS OF DIGESTIVE TRACT: Chronic | ICD-10-CM

## 2021-01-19 PROCEDURE — 37766 PHLEB VEINS - EXTREM 20+: CPT | Mod: LT,GC

## 2021-02-08 ENCOUNTER — APPOINTMENT (OUTPATIENT)
Dept: VASCULAR SURGERY | Facility: CLINIC | Age: 71
End: 2021-02-08
Payer: COMMERCIAL

## 2021-02-08 PROCEDURE — 99072 ADDL SUPL MATRL&STAF TM PHE: CPT

## 2021-02-08 PROCEDURE — 99212 OFFICE O/P EST SF 10 MIN: CPT

## 2021-02-08 NOTE — ASSESSMENT
[FreeTextEntry1] : 70 y/o M with PMH of varicose veins, LLE SSV reflux s/p RFA with residual bulging painful varicose vein s/p LLE stab phlebectomy 1/19/2021 presents for follow up evaluation. Pt very happy with the result. Reassured pt his LLE swelling and pulling sensation will gradually resolve over time, if he experiences pain to take Tylenol or Aleve. No vascular surgery interventions indicated at this time. He will continue to f/u with us here PRN.

## 2021-02-08 NOTE — HISTORY OF PRESENT ILLNESS
[FreeTextEntry1] : 70 y/o M with PMH of varicose veins, LLE SSV reflux s/p RFA with residual bulging painful varicose vein s/p LLE stab phlebectomy 1/19/2021 presents for follow up evaluation. Pt reports feeling well overall, he is happy with the outcome. He does report noticing mild swelling to the calf and has been experiencing L calf "pulling" sensation but does not interfere with his daily activities. Denies any skin breakdown, skin discoloration,  fever or chills.

## 2021-02-08 NOTE — ADDENDUM
[FreeTextEntry1] : This note was written by Evita Flood on 02/08/2021 acting as scribe for Delgado Graves M.D.\par \par I, Delgado Waldron have read and attest that all the information, medical decision making and discharge instructions within are true and accurate.

## 2021-02-08 NOTE — PHYSICAL EXAM
[Respiratory Effort] : normal respiratory effort [Normal Rate and Rhythm] : normal rate and rhythm [Ankle Swelling (On Exam)] : present [Ankle Swelling On The Left] : of the left ankle [Ankle Swelling On The Right] : mild [Alert] : alert [Oriented to Person] : oriented to person [Oriented to Place] : oriented to place [Oriented to Time] : oriented to time [Calm] : calm [Varicose Veins Of Lower Extremities] : not present [] : not present [Abdomen Tenderness] : ~T ~M No abdominal tenderness [de-identified] : Well appearing  [de-identified] : NC/AT  [de-identified] : Supple  [de-identified] : FROM [de-identified] : L calf mild swelling, no evidence of varicose veins, no palpable cords throughout.

## 2021-03-01 ENCOUNTER — APPOINTMENT (OUTPATIENT)
Dept: PULMONOLOGY | Facility: CLINIC | Age: 71
End: 2021-03-01
Payer: COMMERCIAL

## 2021-03-01 VITALS
BODY MASS INDEX: 28.99 KG/M2 | DIASTOLIC BLOOD PRESSURE: 84 MMHG | HEIGHT: 66 IN | TEMPERATURE: 98.1 F | OXYGEN SATURATION: 96 % | HEART RATE: 79 BPM | SYSTOLIC BLOOD PRESSURE: 138 MMHG | WEIGHT: 180.38 LBS

## 2021-03-01 PROCEDURE — 71046 X-RAY EXAM CHEST 2 VIEWS: CPT

## 2021-03-01 PROCEDURE — 99203 OFFICE O/P NEW LOW 30 MIN: CPT | Mod: 25

## 2021-03-01 PROCEDURE — 99072 ADDL SUPL MATRL&STAF TM PHE: CPT

## 2021-03-01 RX ORDER — ASPIRIN 81 MG/1
81 TABLET, COATED ORAL
Qty: 90 | Refills: 0 | Status: ACTIVE | COMMUNITY
Start: 2020-08-10

## 2021-03-02 ENCOUNTER — APPOINTMENT (OUTPATIENT)
Dept: UROLOGY | Facility: CLINIC | Age: 71
End: 2021-03-02
Payer: MEDICARE

## 2021-03-02 VITALS — HEART RATE: 70 BPM | TEMPERATURE: 97.2 F | DIASTOLIC BLOOD PRESSURE: 85 MMHG | SYSTOLIC BLOOD PRESSURE: 154 MMHG

## 2021-03-02 PROCEDURE — 99204 OFFICE O/P NEW MOD 45 MIN: CPT

## 2021-03-02 PROCEDURE — 99072 ADDL SUPL MATRL&STAF TM PHE: CPT

## 2021-03-02 NOTE — ASSESSMENT
[FreeTextEntry1] : elevated PSA\par abrnomal MRI\par to bring in disc\par PSA today\par UA UCX today\par refer for likely fusion biopsy\par \par BPH\par increase to flomax 0.8\par will f/u after

## 2021-03-02 NOTE — HISTORY OF PRESENT ILLNESS
[FreeTextEntry1] : 71M generally healthy referred with elevated PSA. previously following with another urologist - on oxybutynin 5mg qd and tamsuolisn 0.4 qhs. severe LUTs. nocturia 4-5. no double voding. no hematurai. +frequency. +urgency. slow FOS. PSA ?3.5.\par no additional complaints.\par no family history prostate kdiney bladder cancer\par MRI prostate - 82 gram gland\par PIRADS 4 lesion on report 1.1cm noted

## 2021-03-02 NOTE — PHYSICAL EXAM
[Urethral Meatus] : meatus normal [Penis Abnormality] : normal uncircumcised penis [Urinary Bladder Findings] : the bladder was normal on palpation [Scrotum] : the scrotum was normal [Epididymis] : the epididymides were normal [Testes Tenderness] : no tenderness of the testes [Testes Mass (___cm)] : there were no testicular masses [No Prostate Nodules] : no prostate nodules [Prostate Size ___ gm] : prostate size [unfilled] gm

## 2021-03-03 LAB
APPEARANCE: CLEAR
BACTERIA UR CULT: NORMAL
BACTERIA: NEGATIVE
BILIRUBIN URINE: NEGATIVE
BLOOD URINE: NEGATIVE
COLOR: NORMAL
GLUCOSE QUALITATIVE U: NEGATIVE
HYALINE CASTS: 0 /LPF
KETONES URINE: NEGATIVE
LEUKOCYTE ESTERASE URINE: NEGATIVE
MICROSCOPIC-UA: NORMAL
NITRITE URINE: NEGATIVE
PH URINE: 6.5
PROTEIN URINE: NEGATIVE
PSA FREE FLD-MCNC: 17 %
PSA FREE SERPL-MCNC: 0.86 NG/ML
PSA SERPL-MCNC: 4.99 NG/ML
RED BLOOD CELLS URINE: 2 /HPF
SPECIFIC GRAVITY URINE: 1.02
SQUAMOUS EPITHELIAL CELLS: 0 /HPF
UROBILINOGEN URINE: NORMAL
WHITE BLOOD CELLS URINE: 0 /HPF

## 2021-03-06 ENCOUNTER — APPOINTMENT (OUTPATIENT)
Dept: DISASTER EMERGENCY | Facility: CLINIC | Age: 71
End: 2021-03-06

## 2021-03-06 DIAGNOSIS — Z01.818 ENCOUNTER FOR OTHER PREPROCEDURAL EXAMINATION: ICD-10-CM

## 2021-03-07 LAB — SARS-COV-2 N GENE NPH QL NAA+PROBE: NOT DETECTED

## 2021-03-08 ENCOUNTER — APPOINTMENT (OUTPATIENT)
Dept: UROLOGY | Facility: CLINIC | Age: 71
End: 2021-03-08
Payer: COMMERCIAL

## 2021-03-08 ENCOUNTER — OUTPATIENT (OUTPATIENT)
Dept: OUTPATIENT SERVICES | Facility: HOSPITAL | Age: 71
LOS: 1 days | End: 2021-03-08
Payer: MEDICARE

## 2021-03-08 VITALS — TEMPERATURE: 97.2 F | HEART RATE: 81 BPM | SYSTOLIC BLOOD PRESSURE: 154 MMHG | DIASTOLIC BLOOD PRESSURE: 85 MMHG

## 2021-03-08 DIAGNOSIS — Z90.49 ACQUIRED ABSENCE OF OTHER SPECIFIED PARTS OF DIGESTIVE TRACT: Chronic | ICD-10-CM

## 2021-03-08 DIAGNOSIS — R35.1 BENIGN PROSTATIC HYPERPLASIA WITH LOWER URINARY TRACT SYMPMS: ICD-10-CM

## 2021-03-08 DIAGNOSIS — N40.1 BENIGN PROSTATIC HYPERPLASIA WITH LOWER URINARY TRACT SYMPMS: ICD-10-CM

## 2021-03-08 LAB
BASOPHILS # BLD AUTO: 0.07 K/UL
BASOPHILS NFR BLD AUTO: 1.1 %
EOSINOPHIL # BLD AUTO: 0.05 K/UL
EOSINOPHIL NFR BLD AUTO: 0.8 %
HCT VFR BLD CALC: 44.2 %
HGB BLD-MCNC: 14.4 G/DL
IMM GRANULOCYTES NFR BLD AUTO: 0.5 %
LYMPHOCYTES # BLD AUTO: 2.14 K/UL
LYMPHOCYTES NFR BLD AUTO: 33.9 %
MAN DIFF?: NORMAL
MCHC RBC-ENTMCNC: 29.4 PG
MCHC RBC-ENTMCNC: 32.6 GM/DL
MCV RBC AUTO: 90.2 FL
MONOCYTES # BLD AUTO: 0.44 K/UL
MONOCYTES NFR BLD AUTO: 7 %
NEUTROPHILS # BLD AUTO: 3.59 K/UL
NEUTROPHILS NFR BLD AUTO: 56.7 %
PLATELET # BLD AUTO: 169 K/UL
RBC # BLD: 4.9 M/UL
RBC # FLD: 12.9 %
WBC # FLD AUTO: 6.32 K/UL

## 2021-03-08 PROCEDURE — 71046 X-RAY EXAM CHEST 2 VIEWS: CPT

## 2021-03-08 PROCEDURE — 99214 OFFICE O/P EST MOD 30 MIN: CPT | Mod: 57

## 2021-03-08 PROCEDURE — 71046 X-RAY EXAM CHEST 2 VIEWS: CPT | Mod: 26

## 2021-03-08 PROCEDURE — 99072 ADDL SUPL MATRL&STAF TM PHE: CPT

## 2021-03-08 NOTE — HISTORY OF PRESENT ILLNESS
[FreeTextEntry1] : 70yo male with h/o elevated PSA, prior negative biopsy years ago, had recent MRI showing PI-RADS 4 lesion. Here to discuss MRI fusion biopsy. \par \par MRI report: 82gm prostate, Lesion 1: PI-RADS 4 lesion right peripheral zone, Lesion 2: Pi-RADS 3 lesion left apex [Urinary Urgency] : urinary urgency [Urinary Frequency] : urinary frequency [Weak Stream] : weak stream [None] : None

## 2021-03-08 NOTE — REASON FOR VISIT
[Follow-up Visit ___] : a follow-up visit  for [unfilled] [[employer] ] : provided by [employer]

## 2021-03-08 NOTE — ASSESSMENT
[FreeTextEntry1] : 72yo male with elevated PSA in 4-5 range\par Reviewed MRI imaging and report\par recommend MRI fusion biopsy under sedation \par Reviewed transperineal biopsy procedure, risks and benefits\par Medical clearance

## 2021-03-09 ENCOUNTER — APPOINTMENT (OUTPATIENT)
Dept: PULMONOLOGY | Facility: CLINIC | Age: 71
End: 2021-03-09
Payer: MEDICARE

## 2021-03-09 LAB
ALBUMIN SERPL ELPH-MCNC: 4.7 G/DL
ALP BLD-CCNC: 96 U/L
ALT SERPL-CCNC: 36 U/L
ANION GAP SERPL CALC-SCNC: 10 MMOL/L
APPEARANCE: CLEAR
APTT BLD: 32 SEC
AST SERPL-CCNC: 26 U/L
BACTERIA UR CULT: NORMAL
BACTERIA: NEGATIVE
BILIRUB SERPL-MCNC: 0.4 MG/DL
BILIRUBIN URINE: NEGATIVE
BLOOD URINE: NORMAL
BUN SERPL-MCNC: 20 MG/DL
CALCIUM SERPL-MCNC: 9.9 MG/DL
CHLORIDE SERPL-SCNC: 103 MMOL/L
CO2 SERPL-SCNC: 28 MMOL/L
COLOR: YELLOW
CREAT SERPL-MCNC: 0.89 MG/DL
GLUCOSE QUALITATIVE U: NEGATIVE
GLUCOSE SERPL-MCNC: 86 MG/DL
HYALINE CASTS: 1 /LPF
INR PPP: 1.02 RATIO
KETONES URINE: NEGATIVE
LEUKOCYTE ESTERASE URINE: NEGATIVE
MICROSCOPIC-UA: NORMAL
NITRITE URINE: NEGATIVE
PH URINE: 7
POTASSIUM SERPL-SCNC: 4.5 MMOL/L
PROT SERPL-MCNC: 7.4 G/DL
PROTEIN URINE: NEGATIVE
PSA SERPL-MCNC: 4.51 NG/ML
PT BLD: 12 SEC
RED BLOOD CELLS URINE: 9 /HPF
SODIUM SERPL-SCNC: 142 MMOL/L
SPECIFIC GRAVITY URINE: 1.02
SQUAMOUS EPITHELIAL CELLS: 0 /HPF
UROBILINOGEN URINE: NORMAL
WHITE BLOOD CELLS URINE: 0 /HPF

## 2021-03-09 PROCEDURE — 94060 EVALUATION OF WHEEZING: CPT

## 2021-03-09 PROCEDURE — 99072 ADDL SUPL MATRL&STAF TM PHE: CPT

## 2021-03-09 PROCEDURE — 94727 GAS DIL/WSHOT DETER LNG VOL: CPT

## 2021-03-09 PROCEDURE — 94729 DIFFUSING CAPACITY: CPT

## 2021-03-09 NOTE — ASSESSMENT
[FreeTextEntry1] : Data reviewed:\par \par PA/lat CXR in office 03/01/2021 : clear lungs\par \par Impression:\par Asymptomatic man w occupational dust exposure\par \par Plan:\par Will bring him back to check baseline PFT, but no evident disease from today's evaluation.\par --\par PFT 3/9/21: entirely normal, no BD response

## 2021-03-09 NOTE — HISTORY OF PRESENT ILLNESS
[TextBox_4] : 03/01/2021: PI [id number]. Referred here by his daughter who works at hospital. 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 He denies dyspnea, cough or sputum. He smoked a few cigarettes, maybe one a day, from 20-50. He is retired. He stays home due to pandemic. He exercises a little. Had first Covid vaccine Feb 12.\par

## 2021-03-19 DIAGNOSIS — Z01.818 ENCOUNTER FOR OTHER PREPROCEDURAL EXAMINATION: ICD-10-CM

## 2021-03-20 ENCOUNTER — APPOINTMENT (OUTPATIENT)
Dept: DISASTER EMERGENCY | Facility: CLINIC | Age: 71
End: 2021-03-20

## 2021-03-21 LAB — SARS-COV-2 N GENE NPH QL NAA+PROBE: NOT DETECTED

## 2021-03-22 ENCOUNTER — TRANSCRIPTION ENCOUNTER (OUTPATIENT)
Age: 71
End: 2021-03-22

## 2021-03-23 ENCOUNTER — OUTPATIENT (OUTPATIENT)
Dept: OUTPATIENT SERVICES | Facility: HOSPITAL | Age: 71
LOS: 1 days | Discharge: HOME | End: 2021-03-23
Payer: MEDICARE

## 2021-03-23 ENCOUNTER — APPOINTMENT (OUTPATIENT)
Dept: UROLOGY | Facility: AMBULATORY SURGERY CENTER | Age: 71
End: 2021-03-23

## 2021-03-23 ENCOUNTER — RESULT REVIEW (OUTPATIENT)
Age: 71
End: 2021-03-23

## 2021-03-23 DIAGNOSIS — Z90.49 ACQUIRED ABSENCE OF OTHER SPECIFIED PARTS OF DIGESTIVE TRACT: Chronic | ICD-10-CM

## 2021-03-23 PROCEDURE — 88305 TISSUE EXAM BY PATHOLOGIST: CPT | Mod: 26

## 2021-03-23 PROCEDURE — 55706 BX PRST8 NDL SAT SAMPLING: CPT | Mod: 22

## 2021-03-23 PROCEDURE — 76872 US TRANSRECTAL: CPT | Mod: 26

## 2021-03-23 PROCEDURE — 76942 ECHO GUIDE FOR BIOPSY: CPT | Mod: 26,59

## 2021-03-24 ENCOUNTER — EMERGENCY (EMERGENCY)
Facility: HOSPITAL | Age: 71
LOS: 1 days | Discharge: HOME | End: 2021-03-24
Attending: STUDENT IN AN ORGANIZED HEALTH CARE EDUCATION/TRAINING PROGRAM
Payer: MEDICARE

## 2021-03-24 VITALS
DIASTOLIC BLOOD PRESSURE: 89 MMHG | HEIGHT: 68 IN | TEMPERATURE: 99 F | OXYGEN SATURATION: 94 % | SYSTOLIC BLOOD PRESSURE: 166 MMHG | WEIGHT: 179.9 LBS | RESPIRATION RATE: 18 BRPM | HEART RATE: 99 BPM

## 2021-03-24 DIAGNOSIS — Z90.49 ACQUIRED ABSENCE OF OTHER SPECIFIED PARTS OF DIGESTIVE TRACT: Chronic | ICD-10-CM

## 2021-03-24 LAB
ALBUMIN SERPL ELPH-MCNC: 4.4 G/DL — SIGNIFICANT CHANGE UP (ref 3.5–5)
ALP SERPL-CCNC: 103 U/L — SIGNIFICANT CHANGE UP (ref 40–120)
ALT FLD-CCNC: 41 U/L DA — SIGNIFICANT CHANGE UP (ref 10–60)
ANION GAP SERPL CALC-SCNC: 6 MMOL/L — SIGNIFICANT CHANGE UP (ref 5–17)
APPEARANCE UR: ABNORMAL
APTT BLD: 28.8 SEC — SIGNIFICANT CHANGE UP (ref 27.5–35.5)
AST SERPL-CCNC: 21 U/L — SIGNIFICANT CHANGE UP (ref 10–40)
BACTERIA # UR AUTO: ABNORMAL /HPF
BASOPHILS # BLD AUTO: 0.05 K/UL — SIGNIFICANT CHANGE UP (ref 0–0.2)
BASOPHILS NFR BLD AUTO: 0.4 % — SIGNIFICANT CHANGE UP (ref 0–2)
BILIRUB SERPL-MCNC: 0.5 MG/DL — SIGNIFICANT CHANGE UP (ref 0.2–1.2)
BILIRUB UR-MCNC: NEGATIVE — SIGNIFICANT CHANGE UP
BUN SERPL-MCNC: 17 MG/DL — SIGNIFICANT CHANGE UP (ref 7–18)
CALCIUM SERPL-MCNC: 9.3 MG/DL — SIGNIFICANT CHANGE UP (ref 8.4–10.5)
CHLORIDE SERPL-SCNC: 101 MMOL/L — SIGNIFICANT CHANGE UP (ref 96–108)
CO2 SERPL-SCNC: 29 MMOL/L — SIGNIFICANT CHANGE UP (ref 22–31)
COLOR SPEC: ABNORMAL
CREAT SERPL-MCNC: 1.06 MG/DL — SIGNIFICANT CHANGE UP (ref 0.5–1.3)
DIFF PNL FLD: ABNORMAL
EOSINOPHIL # BLD AUTO: 0.08 K/UL — SIGNIFICANT CHANGE UP (ref 0–0.5)
EOSINOPHIL NFR BLD AUTO: 0.7 % — SIGNIFICANT CHANGE UP (ref 0–6)
GLUCOSE SERPL-MCNC: 175 MG/DL — HIGH (ref 70–99)
GLUCOSE UR QL: NEGATIVE — SIGNIFICANT CHANGE UP
HCT VFR BLD CALC: 44 % — SIGNIFICANT CHANGE UP (ref 39–50)
HGB BLD-MCNC: 14.4 G/DL — SIGNIFICANT CHANGE UP (ref 13–17)
IMM GRANULOCYTES NFR BLD AUTO: 1 % — SIGNIFICANT CHANGE UP (ref 0–1.5)
INR BLD: 1.07 RATIO — SIGNIFICANT CHANGE UP (ref 0.88–1.16)
KETONES UR-MCNC: NEGATIVE — SIGNIFICANT CHANGE UP
LACTATE SERPL-SCNC: 1.8 MMOL/L — SIGNIFICANT CHANGE UP (ref 0.7–2)
LEUKOCYTE ESTERASE UR-ACNC: ABNORMAL
LIDOCAIN IGE QN: 165 U/L — SIGNIFICANT CHANGE UP (ref 73–393)
LYMPHOCYTES # BLD AUTO: 0.97 K/UL — LOW (ref 1–3.3)
LYMPHOCYTES # BLD AUTO: 8.5 % — LOW (ref 13–44)
MCHC RBC-ENTMCNC: 29.1 PG — SIGNIFICANT CHANGE UP (ref 27–34)
MCHC RBC-ENTMCNC: 32.7 GM/DL — SIGNIFICANT CHANGE UP (ref 32–36)
MCV RBC AUTO: 89.1 FL — SIGNIFICANT CHANGE UP (ref 80–100)
MONOCYTES # BLD AUTO: 0.36 K/UL — SIGNIFICANT CHANGE UP (ref 0–0.9)
MONOCYTES NFR BLD AUTO: 3.2 % — SIGNIFICANT CHANGE UP (ref 2–14)
NEUTROPHILS # BLD AUTO: 9.83 K/UL — HIGH (ref 1.8–7.4)
NEUTROPHILS NFR BLD AUTO: 86.2 % — HIGH (ref 43–77)
NITRITE UR-MCNC: NEGATIVE — SIGNIFICANT CHANGE UP
NRBC # BLD: 0 /100 WBCS — SIGNIFICANT CHANGE UP (ref 0–0)
PH UR: 6.5 — SIGNIFICANT CHANGE UP (ref 5–8)
PLATELET # BLD AUTO: 159 K/UL — SIGNIFICANT CHANGE UP (ref 150–400)
POTASSIUM SERPL-MCNC: 4.1 MMOL/L — SIGNIFICANT CHANGE UP (ref 3.5–5.3)
POTASSIUM SERPL-SCNC: 4.1 MMOL/L — SIGNIFICANT CHANGE UP (ref 3.5–5.3)
PROT SERPL-MCNC: 8.3 G/DL — SIGNIFICANT CHANGE UP (ref 6–8.3)
PROT UR-MCNC: 100
PROTHROM AB SERPL-ACNC: 12.7 SEC — SIGNIFICANT CHANGE UP (ref 10.6–13.6)
RBC # BLD: 4.94 M/UL — SIGNIFICANT CHANGE UP (ref 4.2–5.8)
RBC # FLD: 12.7 % — SIGNIFICANT CHANGE UP (ref 10.3–14.5)
RBC CASTS # UR COMP ASSIST: >50 /HPF (ref 0–2)
SODIUM SERPL-SCNC: 136 MMOL/L — SIGNIFICANT CHANGE UP (ref 135–145)
SP GR SPEC: 1.01 — SIGNIFICANT CHANGE UP (ref 1.01–1.02)
UROBILINOGEN FLD QL: NEGATIVE — SIGNIFICANT CHANGE UP
WBC # BLD: 11.4 K/UL — HIGH (ref 3.8–10.5)
WBC # FLD AUTO: 11.4 K/UL — HIGH (ref 3.8–10.5)
WBC UR QL: SIGNIFICANT CHANGE UP /HPF (ref 0–5)

## 2021-03-24 PROCEDURE — 83605 ASSAY OF LACTIC ACID: CPT

## 2021-03-24 PROCEDURE — 80053 COMPREHEN METABOLIC PANEL: CPT

## 2021-03-24 PROCEDURE — 87086 URINE CULTURE/COLONY COUNT: CPT

## 2021-03-24 PROCEDURE — 83690 ASSAY OF LIPASE: CPT

## 2021-03-24 PROCEDURE — 85610 PROTHROMBIN TIME: CPT

## 2021-03-24 PROCEDURE — 85025 COMPLETE CBC W/AUTO DIFF WBC: CPT

## 2021-03-24 PROCEDURE — 81001 URINALYSIS AUTO W/SCOPE: CPT

## 2021-03-24 PROCEDURE — 99285 EMERGENCY DEPT VISIT HI MDM: CPT

## 2021-03-24 PROCEDURE — 36415 COLL VENOUS BLD VENIPUNCTURE: CPT

## 2021-03-24 PROCEDURE — 99284 EMERGENCY DEPT VISIT MOD MDM: CPT | Mod: 25

## 2021-03-24 PROCEDURE — 85730 THROMBOPLASTIN TIME PARTIAL: CPT

## 2021-03-24 PROCEDURE — 96374 THER/PROPH/DIAG INJ IV PUSH: CPT

## 2021-03-24 RX ORDER — IOHEXOL 300 MG/ML
30 INJECTION, SOLUTION INTRAVENOUS ONCE
Refills: 0 | Status: COMPLETED | OUTPATIENT
Start: 2021-03-24 | End: 2021-03-24

## 2021-03-24 RX ORDER — MORPHINE SULFATE 50 MG/1
6 CAPSULE, EXTENDED RELEASE ORAL ONCE
Refills: 0 | Status: DISCONTINUED | OUTPATIENT
Start: 2021-03-24 | End: 2021-03-24

## 2021-03-24 RX ADMIN — MORPHINE SULFATE 6 MILLIGRAM(S): 50 CAPSULE, EXTENDED RELEASE ORAL at 23:59

## 2021-03-24 RX ADMIN — IOHEXOL 30 MILLILITER(S): 300 INJECTION, SOLUTION INTRAVENOUS at 23:59

## 2021-03-24 NOTE — ED PROVIDER NOTE - CLINICAL SUMMARY MEDICAL DECISION MAKING FREE TEXT BOX
Patient presenting with history of urinary retention after surgery yesterday. Bedside ultrasound showing approximately 1L of urine in bladder. Attempting to contact private  doctor. Patient stable will reassess. Patient presenting with history of urinary retention after surgery yesterday. Bedside ultrasound showing approximately 1L of urine in bladder. Attempting to contact private  doctor. Patient stable will reassess.    Attempts to contact pt's  doc unsuccessful, spoke with  on call Dr Farias who rec placing coude. Catheter placed easily w/o complication and 1,000cc returned. S/p jhaveri placement pt monitored for 1 hour and states that he is now feeling well with no pain or other symptoms and requesting d/c. Rec pt to call  in the morning. DC with leg bag. Discussed with pt my clinical impression and results, patient given strict return precautions if persistent or worsening of symptoms occurs, and need for close follow up. Pt expressed understanding and agrees with plan. Pt is well appearing with a reassuring exam. Discharge home with PMD or Specialist f/u within 5 days.

## 2021-03-24 NOTE — ED PROVIDER NOTE - PATIENT PORTAL LINK FT
You can access the FollowMyHealth Patient Portal offered by [facility] by registering at the following website: [email or web address]. By joining [employer]’s FollowMyHealth portal, you will also be able to view your health information using other applications (apps) compatible with our system.

## 2021-03-24 NOTE — ED PROVIDER NOTE - OBJECTIVE STATEMENT
72 y/o male with history of appendectomy, and BPH, s/p biopsy yesterday (does not have biopsy results), denies any other past medical problems, presenting with 1 day of difficulty urinating and bloody urine after prostate surgery. Patient states that he was initially able to urinate and over the last day has not been able to fully void. Patient had surgery with Dr. Harvey with no known complications. Patient denies fever, penile discharge, nausea, vomiting, diarrhea, constipation, chest pain, shortness of breath, and any other recent illnesses or hospitalizations. NKDA.

## 2021-03-24 NOTE — ED PROVIDER NOTE - NSFOLLOWUPINSTRUCTIONS_ED_ALL_ED_FT
Hoy lo vieron en la giovani de emergencias por retención urinaria después de un procedimiento de próstata. Llame a wesley cirujano de Urología por la mañana para informarle de esta complicación y obtener la próxima loy disponible.    Llame a wesley médico de cabecera para informarle de esta visita a la giovani de emergencias y obtenga la próxima loy disponible dentro de los próximos 5 días. Cordova comentamos, regrese a la giovani de emergencias si tiene algún síntoma que empeora.  Ya no creemos que necesite más atención de emergencia o admisión al hospital en denita momento.    Si brittany hemos determinado que actualmente está estable para el jag, sabemos que las cosas pueden cambiar. Busque atención médica inmediata o regrese a la giovani de emergencias si experimenta alguno de los siguientes:   Cualquier síntoma que empeore o persista   Dolor severo   Dolor de pecho   Respiración dificultosa   Sangrado   Desmayarse   Erupción severa   Incapacidad para comer o beber   Fiebre persistente    Consulte a un médico de atención primaria o un especialista en un plazo de 5 días para asegurarse de que está mejorando.    Llame a los números de teléfono de [facility] que figuran en denita documento si tiene algún problema para obtener elisha loy de seguimiento.    ¡Te deseo lo mejor! -Dr Ashraf

## 2021-03-24 NOTE — ED PROVIDER NOTE - NSFOLLOWUPCLINICS_GEN_ALL_ED_FT
[facility]  Urology  [address] [place] [address]  Phone: [phone]  Fax: [phone]  Follow Up Time: Urgent

## 2021-03-24 NOTE — ED ADULT TRIAGE NOTE - CHIEF COMPLAINT QUOTE
c/o bloody urine /bilateral groin pain /s/p prostate procedure done  yesterday @ [facility] as per daughter

## 2021-03-24 NOTE — ED PROVIDER NOTE - PHYSICAL EXAMINATION
Vital Signs Reviewed  GEN: Uncomfortable, NAD, AAOx3  HEENT: NCAT, MMM, Neck Supple  RESP: CTAB, No rales/rhonchi/wheezing  CV: RRR, S1S2, No murmurs  ABD: Suprapubic tenderness and fullness without guarding, Soft, ND, No masses, No CVA Tenderness  Extrem/Skin: Equal pulses bilat, No cyanosis/edema/rashes  Neuro: No focal deficits   : blood at tip of penis with no lesion noted.

## 2021-03-25 ENCOUNTER — NON-APPOINTMENT (OUTPATIENT)
Age: 71
End: 2021-03-25

## 2021-03-25 LAB
CULTURE RESULTS: NO GROWTH — SIGNIFICANT CHANGE UP
SPECIMEN SOURCE: SIGNIFICANT CHANGE UP
SURGICAL PATHOLOGY STUDY: SIGNIFICANT CHANGE UP

## 2021-03-25 NOTE — ED ADULT NURSE NOTE - OBJECTIVE STATEMENT
pt presents to ED with c/o of bloody urine /bilateral groin pain /s/p prostate procedure done  yesterday @ [facility] as per daughter. Denies chest pain, sob and N/V.

## 2021-03-29 ENCOUNTER — APPOINTMENT (OUTPATIENT)
Dept: UROLOGY | Facility: CLINIC | Age: 71
End: 2021-03-29
Payer: COMMERCIAL

## 2021-03-29 VITALS — HEART RATE: 80 BPM | TEMPERATURE: 97.5 F | DIASTOLIC BLOOD PRESSURE: 78 MMHG | SYSTOLIC BLOOD PRESSURE: 131 MMHG

## 2021-03-29 PROCEDURE — 51702 INSERT TEMP BLADDER CATH: CPT | Mod: 58

## 2021-03-29 PROCEDURE — 99072 ADDL SUPL MATRL&STAF TM PHE: CPT

## 2021-03-29 NOTE — ASSESSMENT
[FreeTextEntry1] : 71 year old male who developed urinary retention after fusion transperineal biopsy\par -Catheter removed today intact without difficulty\par -voided 50 cc after catheter removal, unable to void after\par -PVR was 410 cc\par -continue with Tamsulosin and stop oxybutynin\par -pathology discussed with patient and his wife\par \par \par RTC on Friday for catheter removal

## 2021-03-29 NOTE — REVIEW OF SYSTEMS
Anesthesia Assessment: Preoperative EQUATION     Planned Procedure: Procedure(s) (LRB):  RESECTION,NEOPLASM,BLADDER,TRANSURETHRAL (N/A)  Requested Anesthesia Type:General  Surgeon: Jus Marroquin MD  Service: Urology  Known or anticipated Date of Surgery:8/27/2018     Surgeon notes: 8/22 Appt with Dr Shah     Electronic QUestionnaire Assessment completed via nurse interview with patient.      NO AQ     Triage considerations:      The patient has no apparent active cardiac condition (No unstable coronary Syndrome such as severe unstable angina or recent [<1 month] myocardial infarction, decompensated CHF, severe valvular   disease or significant arrhythmia)     Previous anesthesia records:GETA and Complications noted      10/16/2017  Anesthesia Hx:  Hx of Anesthetic complications Pt states she has trouble with memory and sedation for weeks after receiving propofol.   History of prior surgery of interest to airway management or planning:  Denies Personal Hx of Anesthesia complications.   Airway/Jaw/Neck:  Airway Findings: Mouth Opening: Normal Tongue: Normal  General Airway Assessment: Adult  Mallampati: II  TM Distance: Normal, at least 6 cm       Placement Date: 10/16/17 Placement Time: 1153 Method of Intubation: Direct laryngoscopy Inserted by: CRNA Airway Device: Endotracheal Tube Mask Ventilation: Easy Intubated: Postinduction Blade: Bryan #2 Airway Device Size: 7.5 Style: Cuffed Inflation Amount (mL): 7 Placement Verified By: Auscultation;Capnometry;ETT Condensation Grade: Grade II Complicating Factors: Anterior larynx Findings Post-Intubation: Positive EtCO2;Bilateral breath sounds;Atraumatic/Condition of teeth unchanged Depth of Insertion (cm): 22 Secured at: Lips Complications: None Breath Sounds: Equal Bilateral Insertion attempts (enter comment if more than 2 attempts): 2 Removal Date: 10/16/17 Removal Time: 1240  Placement Date: 10/16/17 Placement Time: 1153 Method of Intubation: Direct laryngoscopy  Inserted by: CRNA Airway Device: Endotracheal Tube Airway Device Size: 7.5 Style: Cuffed Depth of Insertion (cm): 22 Inflation Amount (mL): 7 Placement Verified By: Auscultation;Capnometry;ETT Condensation Breath Sounds: Equal Bilateral Insertion attempts (enter comment if more than 2 attempts): 2 Removal Date: 10/16/17 Removal Time: 1240     Subspecialty notes: Cardiology: General, Endocrinology, Hematology/Oncology, Rheumatology     Other important co-morbidities: PONV, Hypothyroidism, IBD,GERD, Osteoporosis, DDD, HX DVT-Patient denied. Stated she has stent behind R knee due to a puncture wound. S/P Colon Bleed-2/2017     Tests already available:  Available tests,  within 3 months , within [facility] .  7/2018 CBC, Iron and TIBC, Ferritin   6/2018 EKG, 2-D Echo   MCOT Monitor                            Instructions given. (See in Nurse's note)     Optimization:  Anesthesia Preop Clinic Assessment  Indicated-not required for this surgery. Had anesthesia within a year    Medical Opinion Indicated-ENT Statement of Optimization                            Sub-specialist consult indicated: Cardiology                                        Plan:    Testing:  BMP                            Consultation:Statement of Optimization from ENT,  Cardiology Clearance  Appt on 8/22/18 Cardiology                           Patient  has previously scheduled Medical Appointment: 8/22 Dr Shah     Navigation: Tests Scheduled. 8/22                        Results will be tracked by Preop Clinic.     Patient had an infection in her left nostril. Do not use left nostril for anything.     Lennie Ruiz RN                          Electronically signed by Lennie Ruiz RN at 8/20/2018 11:06 AM                                                                                                                08/20/2018  Selam Botello is a 74 y.o., female.    Pre-op Assessment         Review of Systems  Cardiovascular:  Disorder of  Cardiac Rhythm  Other Cardiac Conditions Atrial tachycardia  Hepatic/GI:  Esophageal / Stomach Disorders Gerd  Bowel Conditions:  Irritable Bowel Syndrome    Musculoskeletal:   Arthritis   Joint Disease:  Arthritis, Osteoarthritis  Bone Disorders: Osteoporosis  Spine Disorders: Degenerative disease and Disc disease    Neurological:  Osteoarthritis    Endocrine:  Thyroid Disease Hypothyroidism               [see HPI] : see HPI [Negative] : Musculoskeletal

## 2021-03-29 NOTE — HISTORY OF PRESENT ILLNESS
[FreeTextEntry1] : This is a 71 year old male who underwent fusion transperineal biopsy on 3/23/21.  \par No problems after biopsy but the next day patient was unable to urinate and [place] [facility] Catheter was placed and 1000 cc drained from bladder\par \par Doing well with catheter, clear yellow urine seen in bag today\par Currently on Tamsulosin 0.8 mg and Oxybutynin ER 5 mg\par \par \par Presents today for a trial void\par

## 2021-03-29 NOTE — PHYSICAL EXAM
[General Appearance - Well Developed] : well developed [General Appearance - Well Nourished] : well nourished [Normal Appearance] : normal appearance [Well Groomed] : well groomed [General Appearance - In No Acute Distress] : no acute distress [Abdomen Soft] : soft [Abdomen Tenderness] : non-tender [Costovertebral Angle Tenderness] : no ~M costovertebral angle tenderness [Oriented To Time, Place, And Person] : oriented to person, place, and time [Affect] : the affect was normal [Mood] : the mood was normal [Not Anxious] : not anxious [Normal Station and Gait] : the gait and station were normal for the patient's age [No Focal Deficits] : no focal deficits [FreeTextEntry1] : uncircumcised penis with some penile swelling present

## 2021-04-02 ENCOUNTER — APPOINTMENT (OUTPATIENT)
Dept: UROLOGY | Facility: CLINIC | Age: 71
End: 2021-04-02
Payer: COMMERCIAL

## 2021-04-02 VITALS — SYSTOLIC BLOOD PRESSURE: 151 MMHG | HEART RATE: 76 BPM | DIASTOLIC BLOOD PRESSURE: 74 MMHG | TEMPERATURE: 98.3 F

## 2021-04-02 DIAGNOSIS — R33.9 RETENTION OF URINE, UNSPECIFIED: ICD-10-CM

## 2021-04-02 PROCEDURE — 99024 POSTOP FOLLOW-UP VISIT: CPT

## 2021-04-02 NOTE — HISTORY OF PRESENT ILLNESS
[FreeTextEntry1] : This is a 71 year old male who underwent fusion transperineal biopsy on 3/23/21. \par No problems after biopsy but the next day patient was unable to urinate and went to [facility].\par Catheter was placed and 1000 cc drained from bladder\par \susana Has been on Tamsulosin 0.8 mg and stopped Oxybutynin\par Failed void trial on 3/29/21 and required catheter to be placed.\par \susana Presents today for a trial void.

## 2021-04-02 NOTE — ASSESSMENT
[FreeTextEntry1] : 71 year old male who developed urinary retention after fusion transperineal biopsy\par -passed void trial\par -discussed s/s to monitor for with patient\par \par RTC on monday to discuss pathology and determine next steps

## 2021-04-02 NOTE — PHYSICAL EXAM
[General Appearance - Well Developed] : well developed [General Appearance - Well Nourished] : well nourished [Normal Appearance] : normal appearance [Well Groomed] : well groomed [General Appearance - In No Acute Distress] : no acute distress [Abdomen Soft] : soft [Abdomen Tenderness] : non-tender [Costovertebral Angle Tenderness] : no ~M costovertebral angle tenderness [] : no respiratory distress [Respiration, Rhythm And Depth] : normal respiratory rhythm and effort [Exaggerated Use Of Accessory Muscles For Inspiration] : no accessory muscle use [Oriented To Time, Place, And Person] : oriented to person, place, and time [Affect] : the affect was normal [Mood] : the mood was normal [Not Anxious] : not anxious [Normal Station and Gait] : the gait and station were normal for the patient's age [FreeTextEntry1] : improved penile edema

## 2021-04-05 ENCOUNTER — APPOINTMENT (OUTPATIENT)
Dept: UROLOGY | Facility: CLINIC | Age: 71
End: 2021-04-05

## 2021-04-05 ENCOUNTER — APPOINTMENT (OUTPATIENT)
Dept: UROLOGY | Facility: CLINIC | Age: 71
End: 2021-04-05
Payer: MEDICARE

## 2021-04-05 VITALS
WEIGHT: 180 LBS | HEART RATE: 90 BPM | HEIGHT: 66 IN | TEMPERATURE: 97.9 F | SYSTOLIC BLOOD PRESSURE: 130 MMHG | BODY MASS INDEX: 28.93 KG/M2 | DIASTOLIC BLOOD PRESSURE: 77 MMHG

## 2021-04-05 PROCEDURE — 99215 OFFICE O/P EST HI 40 MIN: CPT

## 2021-04-05 PROCEDURE — 99072 ADDL SUPL MATRL&STAF TM PHE: CPT

## 2021-04-06 NOTE — HISTORY OF PRESENT ILLNESS
[FreeTextEntry1] : 72yo male with h/o elevated PSA, prior negative biopsy years ago, had recent MRI showing PI-RADS 4 lesion. Here to discuss MRI fusion biopsy. \par \par MRI report: 82gm prostate, Lesion 1: PI-RADS 4 lesion right peripheral zone, Lesion 2: Pi-RADS 3 lesion left apex\par \par 4/05/21 Here for f/u. Underwent MRI fusion biopsy 3/23. Developed postop urinary retention but passed TOV last week.\par Path: 3/14 cores positive, [place] 3+4, 30% core involved. All positive cores on left side. \par He has significant LUTS at baseline. No ED. [Urinary Urgency] : urinary urgency [Urinary Frequency] : urinary frequency [Nocturia] : nocturia [Straining] : straining [Weak Stream] : weak stream [None] : None

## 2021-04-06 NOTE — ASSESSMENT
[FreeTextEntry1] : 70yo male with newly diagnosed cT1c, Mayi 3+4 PCa, PSA 4.5\par Discussed biopsy findings with patient and treatment options\par Patient is considered favorable intermediate risk by NCCN guidelines. \par Standard treatment options including watchful waiting, active surveillance, radical prostatectomy or radiation therapy were discussed. \par Radiation can be given as brachytherapy or external beam \par Discussed alternative treatment options including focal therapy.\par Radical prostatectomy is often performed by robot assisted laparoscopic technique. Discussed immediate risks of surgery including bleeding, infection, blood clot or injury to surrounding organs. Discussed long term risks including urinary incontinence and sexual dysfunction. \par Discussed risks of radiation treatment including urinary and bowel symptoms and sexual dysfunction. \par Occitan-language handout given on treatment options\par He will discuss all options with his family and call us back when he decides \par

## 2021-04-11 DIAGNOSIS — Z01.818 ENCOUNTER FOR OTHER PREPROCEDURAL EXAMINATION: ICD-10-CM

## 2021-04-12 ENCOUNTER — APPOINTMENT (OUTPATIENT)
Dept: DISASTER EMERGENCY | Facility: CLINIC | Age: 71
End: 2021-04-12

## 2021-04-12 ENCOUNTER — APPOINTMENT (OUTPATIENT)
Dept: UROLOGY | Facility: CLINIC | Age: 71
End: 2021-04-12

## 2021-04-13 LAB
APPEARANCE: CLEAR
BACTERIA: NEGATIVE
BILIRUBIN URINE: NEGATIVE
BLOOD URINE: ABNORMAL
COLOR: NORMAL
GLUCOSE QUALITATIVE U: NEGATIVE
HYALINE CASTS: 1 /LPF
KETONES URINE: NEGATIVE
LEUKOCYTE ESTERASE URINE: ABNORMAL
MICROSCOPIC-UA: NORMAL
NITRITE URINE: NEGATIVE
PH URINE: 6.5
PROTEIN URINE: NEGATIVE
RED BLOOD CELLS URINE: 3 /HPF
SARS-COV-2 N GENE NPH QL NAA+PROBE: NOT DETECTED
SPECIFIC GRAVITY URINE: 1.01
SQUAMOUS EPITHELIAL CELLS: 0 /HPF
UROBILINOGEN URINE: NORMAL
WHITE BLOOD CELLS URINE: 6 /HPF

## 2021-04-14 ENCOUNTER — TRANSCRIPTION ENCOUNTER (OUTPATIENT)
Age: 71
End: 2021-04-14

## 2021-04-14 VITALS
HEART RATE: 71 BPM | SYSTOLIC BLOOD PRESSURE: 144 MMHG | TEMPERATURE: 97 F | RESPIRATION RATE: 16 BRPM | WEIGHT: 176.37 LBS | DIASTOLIC BLOOD PRESSURE: 72 MMHG | OXYGEN SATURATION: 98 % | HEIGHT: 67 IN

## 2021-04-14 LAB — BACTERIA UR CULT: NORMAL

## 2021-04-15 ENCOUNTER — RESULT REVIEW (OUTPATIENT)
Age: 71
End: 2021-04-15

## 2021-04-15 ENCOUNTER — APPOINTMENT (OUTPATIENT)
Dept: UROLOGY | Facility: HOSPITAL | Age: 71
End: 2021-04-15

## 2021-04-15 ENCOUNTER — INPATIENT (INPATIENT)
Facility: HOSPITAL | Age: 71
LOS: 0 days | Discharge: HOME | DRG: 708 | End: 2021-04-16
Attending: UROLOGY | Admitting: UROLOGY
Payer: MEDICARE

## 2021-04-15 DIAGNOSIS — Z86.79 PERSONAL HISTORY OF OTHER DISEASES OF THE CIRCULATORY SYSTEM: Chronic | ICD-10-CM

## 2021-04-15 DIAGNOSIS — Z90.49 ACQUIRED ABSENCE OF OTHER SPECIFIED PARTS OF DIGESTIVE TRACT: Chronic | ICD-10-CM

## 2021-04-15 LAB
ANION GAP SERPL CALC-SCNC: 10 MMOL/L — SIGNIFICANT CHANGE UP (ref 5–17)
BLD GP AB SCN SERPL QL: NEGATIVE — SIGNIFICANT CHANGE UP
BUN SERPL-MCNC: 16 MG/DL — SIGNIFICANT CHANGE UP (ref 7–23)
CALCIUM SERPL-MCNC: 9 MG/DL — SIGNIFICANT CHANGE UP (ref 8.4–10.5)
CHLORIDE SERPL-SCNC: 98 MMOL/L — SIGNIFICANT CHANGE UP (ref 96–108)
CO2 SERPL-SCNC: 27 MMOL/L — SIGNIFICANT CHANGE UP (ref 22–31)
CREAT SERPL-MCNC: 1.14 MG/DL — SIGNIFICANT CHANGE UP (ref 0.5–1.3)
GLUCOSE SERPL-MCNC: 188 MG/DL — HIGH (ref 70–99)
HCT VFR BLD CALC: 41 % — SIGNIFICANT CHANGE UP (ref 39–50)
HGB BLD-MCNC: 13.4 G/DL — SIGNIFICANT CHANGE UP (ref 13–17)
MAGNESIUM SERPL-MCNC: 2 MG/DL — SIGNIFICANT CHANGE UP (ref 1.6–2.6)
MCHC RBC-ENTMCNC: 29.9 PG — SIGNIFICANT CHANGE UP (ref 27–34)
MCHC RBC-ENTMCNC: 32.7 GM/DL — SIGNIFICANT CHANGE UP (ref 32–36)
MCV RBC AUTO: 91.5 FL — SIGNIFICANT CHANGE UP (ref 80–100)
NRBC # BLD: 0 /100 WBCS — SIGNIFICANT CHANGE UP (ref 0–0)
PHOSPHATE SERPL-MCNC: 3.6 MG/DL — SIGNIFICANT CHANGE UP (ref 2.5–4.5)
PLATELET # BLD AUTO: 167 K/UL — SIGNIFICANT CHANGE UP (ref 150–400)
POTASSIUM SERPL-MCNC: 4.1 MMOL/L — SIGNIFICANT CHANGE UP (ref 3.5–5.3)
POTASSIUM SERPL-SCNC: 4.1 MMOL/L — SIGNIFICANT CHANGE UP (ref 3.5–5.3)
RBC # BLD: 4.48 M/UL — SIGNIFICANT CHANGE UP (ref 4.2–5.8)
RBC # FLD: 13 % — SIGNIFICANT CHANGE UP (ref 10.3–14.5)
RH IG SCN BLD-IMP: POSITIVE — SIGNIFICANT CHANGE UP
SODIUM SERPL-SCNC: 135 MMOL/L — SIGNIFICANT CHANGE UP (ref 135–145)
WBC # BLD: 13.93 K/UL — HIGH (ref 3.8–10.5)
WBC # FLD AUTO: 13.93 K/UL — HIGH (ref 3.8–10.5)

## 2021-04-15 PROCEDURE — 38571 LAPAROSCOPY LYMPHADENECTOMY: CPT | Mod: AS

## 2021-04-15 PROCEDURE — 38571 LAPAROSCOPY LYMPHADENECTOMY: CPT

## 2021-04-15 PROCEDURE — 88307 TISSUE EXAM BY PATHOLOGIST: CPT | Mod: 26

## 2021-04-15 PROCEDURE — 88305 TISSUE EXAM BY PATHOLOGIST: CPT | Mod: 26

## 2021-04-15 PROCEDURE — 55866 LAPS SURG PRST8ECT RPBIC RAD: CPT

## 2021-04-15 PROCEDURE — 88309 TISSUE EXAM BY PATHOLOGIST: CPT | Mod: 26

## 2021-04-15 PROCEDURE — 55866 LAPS SURG PRST8ECT RPBIC RAD: CPT | Mod: AS

## 2021-04-15 RX ORDER — MORPHINE SULFATE 50 MG/1
2 CAPSULE, EXTENDED RELEASE ORAL ONCE
Refills: 0 | Status: DISCONTINUED | OUTPATIENT
Start: 2021-04-15 | End: 2021-04-15

## 2021-04-15 RX ORDER — SODIUM CHLORIDE 9 MG/ML
1000 INJECTION, SOLUTION INTRAVENOUS
Refills: 0 | Status: DISCONTINUED | OUTPATIENT
Start: 2021-04-15 | End: 2021-04-16

## 2021-04-15 RX ORDER — BUPIVACAINE 13.3 MG/ML
20 INJECTION, SUSPENSION, LIPOSOMAL INFILTRATION ONCE
Refills: 0 | Status: DISCONTINUED | OUTPATIENT
Start: 2021-04-15 | End: 2021-04-15

## 2021-04-15 RX ORDER — HYDROMORPHONE HYDROCHLORIDE 2 MG/ML
0.5 INJECTION INTRAMUSCULAR; INTRAVENOUS; SUBCUTANEOUS ONCE
Refills: 0 | Status: DISCONTINUED | OUTPATIENT
Start: 2021-04-15 | End: 2021-04-15

## 2021-04-15 RX ORDER — DIAZEPAM 5 MG
5 TABLET ORAL EVERY 8 HOURS
Refills: 0 | Status: DISCONTINUED | OUTPATIENT
Start: 2021-04-15 | End: 2021-04-16

## 2021-04-15 RX ORDER — OXYCODONE AND ACETAMINOPHEN 5; 325 MG/1; MG/1
2 TABLET ORAL EVERY 6 HOURS
Refills: 0 | Status: DISCONTINUED | OUTPATIENT
Start: 2021-04-15 | End: 2021-04-16

## 2021-04-15 RX ORDER — OXYBUTYNIN CHLORIDE 5 MG
5 TABLET ORAL EVERY 8 HOURS
Refills: 0 | Status: DISCONTINUED | OUTPATIENT
Start: 2021-04-15 | End: 2021-04-16

## 2021-04-15 RX ORDER — ENOXAPARIN SODIUM 100 MG/ML
40 INJECTION SUBCUTANEOUS ONCE
Refills: 0 | Status: COMPLETED | OUTPATIENT
Start: 2021-04-15 | End: 2021-04-15

## 2021-04-15 RX ORDER — ACETAMINOPHEN 500 MG
1000 TABLET ORAL ONCE
Refills: 0 | Status: COMPLETED | OUTPATIENT
Start: 2021-04-15 | End: 2021-04-15

## 2021-04-15 RX ORDER — OXYCODONE AND ACETAMINOPHEN 5; 325 MG/1; MG/1
1 TABLET ORAL EVERY 4 HOURS
Refills: 0 | Status: DISCONTINUED | OUTPATIENT
Start: 2021-04-15 | End: 2021-04-16

## 2021-04-15 RX ORDER — HEPARIN SODIUM 5000 [USP'U]/ML
5000 INJECTION INTRAVENOUS; SUBCUTANEOUS EVERY 8 HOURS
Refills: 0 | Status: DISCONTINUED | OUTPATIENT
Start: 2021-04-15 | End: 2021-04-16

## 2021-04-15 RX ORDER — GABAPENTIN 400 MG/1
600 CAPSULE ORAL ONCE
Refills: 0 | Status: COMPLETED | OUTPATIENT
Start: 2021-04-15 | End: 2021-04-15

## 2021-04-15 RX ORDER — CEFAZOLIN SODIUM 1 G
1000 VIAL (EA) INJECTION EVERY 8 HOURS
Refills: 0 | Status: DISCONTINUED | OUTPATIENT
Start: 2021-04-15 | End: 2021-04-16

## 2021-04-15 RX ADMIN — OXYCODONE AND ACETAMINOPHEN 1 TABLET(S): 5; 325 TABLET ORAL at 23:00

## 2021-04-15 RX ADMIN — Medication 100 MILLIGRAM(S): at 16:32

## 2021-04-15 RX ADMIN — MORPHINE SULFATE 2 MILLIGRAM(S): 50 CAPSULE, EXTENDED RELEASE ORAL at 15:00

## 2021-04-15 RX ADMIN — Medication 5 MILLIGRAM(S): at 13:47

## 2021-04-15 RX ADMIN — OXYCODONE AND ACETAMINOPHEN 1 TABLET(S): 5; 325 TABLET ORAL at 22:31

## 2021-04-15 RX ADMIN — GABAPENTIN 600 MILLIGRAM(S): 400 CAPSULE ORAL at 07:40

## 2021-04-15 RX ADMIN — MORPHINE SULFATE 2 MILLIGRAM(S): 50 CAPSULE, EXTENDED RELEASE ORAL at 14:59

## 2021-04-15 RX ADMIN — ENOXAPARIN SODIUM 40 MILLIGRAM(S): 100 INJECTION SUBCUTANEOUS at 07:39

## 2021-04-15 RX ADMIN — HEPARIN SODIUM 5000 UNIT(S): 5000 INJECTION INTRAVENOUS; SUBCUTANEOUS at 22:22

## 2021-04-15 RX ADMIN — Medication 1000 MILLIGRAM(S): at 07:40

## 2021-04-15 NOTE — PACU DISCHARGE NOTE - COMMENTS
verbal report given to SOPHIA Warner, v/mary alberto, pt. [facility]

## 2021-04-16 ENCOUNTER — TRANSCRIPTION ENCOUNTER (OUTPATIENT)
Age: 71
End: 2021-04-16

## 2021-04-16 VITALS
HEART RATE: 105 BPM | DIASTOLIC BLOOD PRESSURE: 74 MMHG | SYSTOLIC BLOOD PRESSURE: 132 MMHG | TEMPERATURE: 100 F | RESPIRATION RATE: 18 BRPM | OXYGEN SATURATION: 94 %

## 2021-04-16 DIAGNOSIS — N40.0 BENIGN PROSTATIC HYPERPLASIA WITHOUT LOWER URINARY TRACT SYMPTOMS: ICD-10-CM

## 2021-04-16 LAB
ANION GAP SERPL CALC-SCNC: 6 MMOL/L — SIGNIFICANT CHANGE UP (ref 5–17)
BUN SERPL-MCNC: 16 MG/DL — SIGNIFICANT CHANGE UP (ref 7–23)
CALCIUM SERPL-MCNC: 8.6 MG/DL — SIGNIFICANT CHANGE UP (ref 8.4–10.5)
CHLORIDE SERPL-SCNC: 102 MMOL/L — SIGNIFICANT CHANGE UP (ref 96–108)
CO2 SERPL-SCNC: 28 MMOL/L — SIGNIFICANT CHANGE UP (ref 22–31)
COVID-19 SPIKE DOMAIN AB INTERP: POSITIVE
COVID-19 SPIKE DOMAIN ANTIBODY RESULT: >250 U/ML — HIGH
CREAT SERPL-MCNC: 0.99 MG/DL — SIGNIFICANT CHANGE UP (ref 0.5–1.3)
GLUCOSE SERPL-MCNC: 104 MG/DL — HIGH (ref 70–99)
HCT VFR BLD CALC: 32.4 % — LOW (ref 39–50)
HGB BLD-MCNC: 10.8 G/DL — LOW (ref 13–17)
MAGNESIUM SERPL-MCNC: 2 MG/DL — SIGNIFICANT CHANGE UP (ref 1.6–2.6)
MCHC RBC-ENTMCNC: 30.4 PG — SIGNIFICANT CHANGE UP (ref 27–34)
MCHC RBC-ENTMCNC: 33.3 GM/DL — SIGNIFICANT CHANGE UP (ref 32–36)
MCV RBC AUTO: 91.3 FL — SIGNIFICANT CHANGE UP (ref 80–100)
NRBC # BLD: 0 /100 WBCS — SIGNIFICANT CHANGE UP (ref 0–0)
PLATELET # BLD AUTO: 148 K/UL — LOW (ref 150–400)
POTASSIUM SERPL-MCNC: 4.3 MMOL/L — SIGNIFICANT CHANGE UP (ref 3.5–5.3)
POTASSIUM SERPL-SCNC: 4.3 MMOL/L — SIGNIFICANT CHANGE UP (ref 3.5–5.3)
RBC # BLD: 3.55 M/UL — LOW (ref 4.2–5.8)
RBC # FLD: 13 % — SIGNIFICANT CHANGE UP (ref 10.3–14.5)
SARS-COV-2 IGG+IGM SERPL QL IA: >250 U/ML — HIGH
SARS-COV-2 IGG+IGM SERPL QL IA: POSITIVE
SODIUM SERPL-SCNC: 136 MMOL/L — SIGNIFICANT CHANGE UP (ref 135–145)
WBC # BLD: 10.22 K/UL — SIGNIFICANT CHANGE UP (ref 3.8–10.5)
WBC # FLD AUTO: 10.22 K/UL — SIGNIFICANT CHANGE UP (ref 3.8–10.5)

## 2021-04-16 PROCEDURE — 84100 ASSAY OF PHOSPHORUS: CPT

## 2021-04-16 PROCEDURE — C1889: CPT

## 2021-04-16 PROCEDURE — 85027 COMPLETE CBC AUTOMATED: CPT

## 2021-04-16 PROCEDURE — S2900: CPT

## 2021-04-16 PROCEDURE — 83735 ASSAY OF MAGNESIUM: CPT

## 2021-04-16 PROCEDURE — 86900 BLOOD TYPING SEROLOGIC ABO: CPT

## 2021-04-16 PROCEDURE — 36415 COLL VENOUS BLD VENIPUNCTURE: CPT

## 2021-04-16 PROCEDURE — 86769 SARS-COV-2 COVID-19 ANTIBODY: CPT

## 2021-04-16 PROCEDURE — 86901 BLOOD TYPING SEROLOGIC RH(D): CPT

## 2021-04-16 PROCEDURE — 80048 BASIC METABOLIC PNL TOTAL CA: CPT

## 2021-04-16 PROCEDURE — 88307 TISSUE EXAM BY PATHOLOGIST: CPT

## 2021-04-16 PROCEDURE — 88309 TISSUE EXAM BY PATHOLOGIST: CPT

## 2021-04-16 PROCEDURE — 88305 TISSUE EXAM BY PATHOLOGIST: CPT

## 2021-04-16 PROCEDURE — 86850 RBC ANTIBODY SCREEN: CPT

## 2021-04-16 RX ORDER — ACETAMINOPHEN 500 MG
650 TABLET ORAL EVERY 6 HOURS
Refills: 0 | Status: DISCONTINUED | OUTPATIENT
Start: 2021-04-16 | End: 2021-04-16

## 2021-04-16 RX ORDER — SODIUM CHLORIDE 9 MG/ML
3 INJECTION INTRAMUSCULAR; INTRAVENOUS; SUBCUTANEOUS EVERY 8 HOURS
Refills: 0 | Status: DISCONTINUED | OUTPATIENT
Start: 2021-04-16 | End: 2021-04-16

## 2021-04-16 RX ORDER — SODIUM CHLORIDE 9 MG/ML
500 INJECTION INTRAMUSCULAR; INTRAVENOUS; SUBCUTANEOUS ONCE
Refills: 0 | Status: COMPLETED | OUTPATIENT
Start: 2021-04-16 | End: 2021-04-16

## 2021-04-16 RX ADMIN — SODIUM CHLORIDE 500 MILLILITER(S): 9 INJECTION INTRAMUSCULAR; INTRAVENOUS; SUBCUTANEOUS at 04:29

## 2021-04-16 RX ADMIN — Medication 100 MILLIGRAM(S): at 04:37

## 2021-04-16 RX ADMIN — OXYCODONE AND ACETAMINOPHEN 1 TABLET(S): 5; 325 TABLET ORAL at 14:34

## 2021-04-16 RX ADMIN — Medication 100 MILLIGRAM(S): at 10:15

## 2021-04-16 RX ADMIN — HEPARIN SODIUM 5000 UNIT(S): 5000 INJECTION INTRAVENOUS; SUBCUTANEOUS at 06:52

## 2021-04-16 RX ADMIN — HEPARIN SODIUM 5000 UNIT(S): 5000 INJECTION INTRAVENOUS; SUBCUTANEOUS at 14:06

## 2021-04-16 NOTE — PROGRESS NOTE ADULT - ASSESSMENT
Patient is a 71M w/ CaP s/p RALP. Patient tolerated procedure well VSS, HDS, and afebrile.    Plan:  - Diet: Clears  - Pain control  - OOB/IS  - DVT ppx: SCD  - Abx: Cefazolin
47yo male with PMH of HLD, venous insufficiency s/p RALP POD #1

## 2021-04-16 NOTE — DISCHARGE NOTE PROVIDER - CARE PROVIDER_API CALL
Dariel Harvey)  Urology  [address], [facility], [place] [address]  Phone: [phone]  Fax: [phone]  Follow Up Time:

## 2021-04-16 NOTE — DISCHARGE NOTE PROVIDER - HOSPITAL COURSE
47yo male with PMH of HLD, venous insufficiency s/p RALP 4/15. Pt is hemodynamically stable and optimized for discharge. 45yo male with PMH of HLD, venous insufficiency s/p RALP 4/15. Pt is hemodynamically stable and optimized for discharge.

## 2021-04-16 NOTE — DISCHARGE NOTE PROVIDER - NSDCCPCAREPLAN_GEN_ALL_CORE_FT
PRINCIPAL DISCHARGE DIAGNOSIS  Diagnosis: Prostate CA  Assessment and Plan of Treatment:       SECONDARY DISCHARGE DIAGNOSES  Diagnosis: Hyperlipidemia  Assessment and Plan of Treatment:

## 2021-04-16 NOTE — DISCHARGE NOTE NURSING/CASE MANAGEMENT/SOCIAL WORK - PATIENT PORTAL LINK FT
You can access the FollowMyHealth Patient Portal offered by [facility] by registering at the following website: [email or web address]. By joining [employer]’s FollowMyHealth portal, you will also be able to view your health information using other applications (apps) compatible with our system.

## 2021-04-16 NOTE — PROGRESS NOTE ADULT - PROBLEM SELECTOR PLAN 1
- s/p RALP  - abx: Ancef  - diet: clears   - montior urine output   - pain control   - OOB/IS  - DVT prophylaxis: SCDs, heparin

## 2021-04-16 NOTE — PROGRESS NOTE ADULT - SUBJECTIVE AND OBJECTIVE BOX
INTERVAL HPI/OVERNIGHT EVENTS:  No acute events overnight.    VITALS:    T(F): 98.2 (04-16-21 @ 05:13), Max: 98.2 (04-15-21 @ 17:06)  HR: 84 (04-16-21 @ 05:13) (84 - 104)  BP: 103/58 (04-16-21 @ 05:13) (96/64 - 120/69)  RR: 17 (04-16-21 @ 05:13) (8 - 25)  SpO2: 94% (04-16-21 @ 05:13) (91% - 97%)  Wt(kg): --    I&O's Detail    15 Apr 2021 07:01  -  16 Apr 2021 05:34  --------------------------------------------------------  IN:    Lactated Ringers: 550 mL    Oral Fluid: 550 mL  Total IN: 1100 mL    OUT:    Indwelling Catheter - Urethral (mL): 900 mL  Total OUT: 900 mL    Total NET: 200 mL          MEDICATIONS:    ANTIBIOTICS:  ceFAZolin   IVPB 1000 milliGRAM(s) IV Intermittent every 8 hours      PAIN CONTROL:  acetaminophen   Tablet .. 650 milliGRAM(s) Oral every 6 hours PRN  diazepam    Tablet 5 milliGRAM(s) Oral every 8 hours PRN  oxycodone    5 mG/acetaminophen 325 mG 1 Tablet(s) Oral every 4 hours PRN  oxycodone    5 mG/acetaminophen 325 mG 2 Tablet(s) Oral every 6 hours PRN       MEDS:  oxybutynin 5 milliGRAM(s) Oral every 8 hours PRN      HEME/ONC  heparin   Injectable 5000 Unit(s) SubCutaneous every 8 hours        PHYSICAL EXAM:  General: No acute distress.  Alert and Oriented  Abdominal Exam: soft, appropriately tender, non-distended. incisions c/d/i   Exam: jhaveri in place draining light pink urine. no clots noted       LABS:                        13.4   13.93 )-----------( 167      ( 15 Apr 2021 12:55 )             41.0     04-15    135  |  98  |  16  ----------------------------<  188<H>  4.1   |  27  |  1.14    Ca    9.0      15 Apr 2021 12:55  Phos  3.6     04-15  Mg     2.0     04-15            
  UROLOGY POST OP NOTE (PAGER # [phone])    PROCEDURE: RALP      Pt is a 71M w/ CaP s/p RALP.  Patient is seen at bedside alert and oriented x 3. Patient denies n/v, chest pain, sob.     T(C): 36.3 (04-15-21 @ 12:30), Max: 36.3 (04-15-21 @ 12:30)  HR: 97 (04-15-21 @ 15:00) (85 - 97)  BP: 101/59 (04-15-21 @ 15:00) (96/64 - 120/69)  RR: 22 (04-15-21 @ 15:00) (8 - 25)  SpO2: 93% (04-15-21 @ 15:00) (93% - 97%)  Wt(kg): --    ON PE:    Abdomen: Appropriately soft and tender. Incision site C/D/I.    : Hinson catheter in place draining pink/clear.                          13.4   13.93 )-----------( 167      ( 15 Apr 2021 12:55 )             41.0     04-15    135  |  98  |  16  ----------------------------<  188<H>  4.1   |  27  |  1.14    Ca    9.0      15 Apr 2021 12:55  Phos  3.6     04-15  Mg     2.0     04-15

## 2021-04-16 NOTE — PROGRESS NOTE ADULT - PROBLEM SELECTOR PROBLEM 1
**incomplete Notified by RN pt with BP of 174/103. Repeat manual BP of 172/90. Vitals otherwise stable HR of 81, afebrile.  Pt has been refusing medications intermittently throughout admission. Pt is to be discharged later today.   Examined pt at bedside. Denies chest pain, SOB, palpitations, headache, visual disturbance, leg pain.      Vital Signs Last 24 Hrs  T(C): 36.5 (13 Apr 2021 04:50), Max: 36.6 (12 Apr 2021 21:46)  T(F): 97.7 (13 Apr 2021 04:50), Max: 97.8 (12 Apr 2021 21:46)  HR: 81 (13 Apr 2021 04:50) (68 - 81)  BP: 172/90 (13 Apr 2021 06:40) (141/90 - 174/103)  BP(mean): --  RR: 18 (13 Apr 2021 06:24) (18 - 18)  SpO2: 95% (13 Apr 2021 04:50) (95% - 96%)    Physical Exam:  General: WN/WD NAD  Neurology: A&Ox2-3, nonfocal, FLORES x 4  Head:  Normocephalic, atraumatic  Respiratory: CTA B/L  CV: RRR, S1S2  MSK:  + peripheral pulses, FROM all 4 extremity      Labs:                          11.9   12.38 )-----------( 270      ( 12 Apr 2021 07:44 )             38.7     04-12    140  |  101  |  29<H>  ----------------------------<  115<H>  4.3   |  27  |  1.07    Ca    9.6      12 Apr 2021 07:44    TPro  8.0  /  Alb  3.5  /  TBili  0.2  /  DBili  x   /  AST  16  /  ALT  6<L>  /  AlkPhos  129<H>  04-12        HPI:  The patient is a 83y Male was transfer because of brain mass.  Pt now with BP of 172/90, asymptomatic.    Assessment & Plan:  > STAT Hydralazine 2.5 mg IV push  > Will continue to monitor signs and symptoms  > Repeat BP in 1 hour  > Will endorse to day team    Aimee Coles PA-C (Medicine PA)  #[id number] BPH (benign prostatic hyperplasia)

## 2021-04-16 NOTE — DISCHARGE NOTE PROVIDER - NSDCFUADDINST_GEN_ALL_CORE_FT
Advance diet as tolerated, activity as tolerated. No heavy lifting or straining. Jhaveri to leg bag, Stay well hydrated. If fever >100.4 or any change or worsening of symptoms please call doctor or report to ED. Make follow up appointment with Dr. Harvey for jhaveri removal.     Please start antibiotic 1 day before jhaveri removal appointment.

## 2021-04-16 NOTE — DISCHARGE NOTE PROVIDER - NSDCMRMEDTOKEN_GEN_ALL_CORE_FT
mg oral tablet: 1 tab(s) orally every 6 hours, As Needed pain with food  Robaxin-750 oral tablet: 2 tab(s) orally 3 times a day, As Needed muscle pain  tamsulosin 0.4 mg oral capsule: 1 cap(s) orally once a day  traMADol 50 mg oral tablet: 1 tab(s) orally every 12 hours, As Needed -for moderate pain MDD:2

## 2021-04-19 PROBLEM — E78.5 HYPERLIPIDEMIA, UNSPECIFIED: Chronic | Status: ACTIVE | Noted: 2021-04-14

## 2021-04-20 LAB — SURGICAL PATHOLOGY STUDY: SIGNIFICANT CHANGE UP

## 2021-04-21 DIAGNOSIS — E78.5 HYPERLIPIDEMIA, UNSPECIFIED: ICD-10-CM

## 2021-04-21 DIAGNOSIS — I87.2 VENOUS INSUFFICIENCY (CHRONIC) (PERIPHERAL): ICD-10-CM

## 2021-04-21 DIAGNOSIS — C61 MALIGNANT NEOPLASM OF PROSTATE: ICD-10-CM

## 2021-04-22 ENCOUNTER — NON-APPOINTMENT (OUTPATIENT)
Age: 71
End: 2021-04-22

## 2021-04-23 ENCOUNTER — APPOINTMENT (OUTPATIENT)
Dept: UROLOGY | Facility: CLINIC | Age: 71
End: 2021-04-23
Payer: MEDICARE

## 2021-04-23 ENCOUNTER — NON-APPOINTMENT (OUTPATIENT)
Age: 71
End: 2021-04-23

## 2021-04-23 VITALS — HEART RATE: 89 BPM | SYSTOLIC BLOOD PRESSURE: 130 MMHG | TEMPERATURE: 98.3 F | DIASTOLIC BLOOD PRESSURE: 80 MMHG

## 2021-04-23 PROCEDURE — 99024 POSTOP FOLLOW-UP VISIT: CPT

## 2021-04-26 ENCOUNTER — APPOINTMENT (OUTPATIENT)
Dept: UROLOGY | Facility: CLINIC | Age: 71
End: 2021-04-26
Payer: MEDICARE

## 2021-04-26 VITALS — SYSTOLIC BLOOD PRESSURE: 131 MMHG | DIASTOLIC BLOOD PRESSURE: 83 MMHG | HEART RATE: 97 BPM | TEMPERATURE: 98.1 F

## 2021-04-26 PROCEDURE — 99024 POSTOP FOLLOW-UP VISIT: CPT

## 2021-04-26 NOTE — PHYSICAL EXAM
[General Appearance - Well Developed] : well developed [General Appearance - Well Nourished] : well nourished [Normal Appearance] : normal appearance [Well Groomed] : well groomed [General Appearance - In No Acute Distress] : no acute distress [Abdomen Soft] : soft [Abdomen Tenderness] : non-tender [Costovertebral Angle Tenderness] : no ~M costovertebral angle tenderness [FreeTextEntry1] : Hinson removed, patient voided, PVR = 0  [Oriented To Time, Place, And Person] : oriented to person, place, and time [Affect] : the affect was normal [Mood] : the mood was normal [Not Anxious] : not anxious [Normal Station and Gait] : the gait and station were normal for the patient's age [No Focal Deficits] : no focal deficits

## 2021-04-26 NOTE — HISTORY OF PRESENT ILLNESS
[FreeTextEntry1] : 72yo male with h/o elevated PSA, prior negative biopsy years ago, had recent MRI showing PI-RADS 4 lesion. Here to discuss MRI fusion biopsy. \par \par MRI report: 82gm prostate, Lesion 1: PI-RADS 4 lesion right peripheral zone, Lesion 2: Pi-RADS 3 lesion left apex\par \par 4/05/21 Here for f/u. Underwent MRI fusion biopsy 3/23. Developed postop urinary retention but passed TOV last week.\par Path: 3/14 cores positive, [place] 3+4, 30% core involved. All positive cores on left side. \par He has significant LUTS at baseline. No ED.\par \par 4/26/21 s/p RALP, PLND 4/15/21. Had some hematuria/bladder spasms otherwise doing well\par Final path: pT2N0, Mayi 3+4 tertiary pattern 5, < 3mm positive margin left posterior mid [Hematuria - Gross] : gross hematuria [Incisional Drainage] : no incisional drainage [Incisional Pain] : no incisional pain

## 2021-04-26 NOTE — ASSESSMENT
[FreeTextEntry1] : 70yo male with diagnosed cT1c, Mayi 3+4 PCa, PSA 4.5 \par s/p RALP, PLND 4/15/21\par Reviewed final path: pT2N0, Mayi 3+4 tertiary pattern 5, < 3mm positive margin left posterior mid\par Check PSA in 6 weeks\par [place]egel handout given\par

## 2021-05-12 ENCOUNTER — APPOINTMENT (OUTPATIENT)
Dept: UROLOGY | Facility: CLINIC | Age: 71
End: 2021-05-12
Payer: MEDICARE

## 2021-05-12 VITALS — HEART RATE: 85 BPM | DIASTOLIC BLOOD PRESSURE: 66 MMHG | SYSTOLIC BLOOD PRESSURE: 104 MMHG | TEMPERATURE: 97.3 F

## 2021-05-12 DIAGNOSIS — N45.1 EPIDIDYMITIS: ICD-10-CM

## 2021-05-12 PROCEDURE — 99024 POSTOP FOLLOW-UP VISIT: CPT

## 2021-05-12 NOTE — ASSESSMENT
[FreeTextEntry1] : 70yo male with diagnosed cT1c, Mayi 3+4 PCa, PSA 4.5 \par s/p RALP, PLND 4/15/21\par final path: pT2N0, Mayi 3+4 tertiary pattern 5, < 3mm positive margin left posterior mid\par Postop left epididymitis following catheter removal, now improving\par F/u as scheduled \par

## 2021-05-12 NOTE — PHYSICAL EXAM
[General Appearance - Well Nourished] : well nourished [General Appearance - Well Developed] : well developed [Normal Appearance] : normal appearance [Well Groomed] : well groomed [General Appearance - In No Acute Distress] : no acute distress [Abdomen Soft] : soft [Abdomen Tenderness] : non-tender [Costovertebral Angle Tenderness] : no ~M costovertebral angle tenderness [FreeTextEntry1] : Left scrotal/testis induration, mild swelling, mild tenderness [Oriented To Time, Place, And Person] : oriented to person, place, and time [Affect] : the affect was normal [Not Anxious] : not anxious [Mood] : the mood was normal [Normal Station and Gait] : the gait and station were normal for the patient's age [No Focal Deficits] : no focal deficits

## 2021-05-12 NOTE — HISTORY OF PRESENT ILLNESS
[FreeTextEntry1] : 72yo male with h/o elevated PSA, prior negative biopsy years ago, had recent MRI showing PI-RADS 4 lesion. Here to discuss MRI fusion biopsy. \par \par MRI report: 82gm prostate, Lesion 1: PI-RADS 4 lesion right peripheral zone, Lesion 2: Pi-RADS 3 lesion left apex\par \par 4/05/21 Here for f/u. Underwent MRI fusion biopsy 3/23. Developed postop urinary retention but passed TOV last week.\par Path: 3/14 cores positive, [place] 3+4, 30% core involved. All positive cores on left side. \par He has significant LUTS at baseline. No ED.\par \par 4/26/21 s/p RALP, PLND 4/15/21. Had some hematuria/bladder spasms otherwise doing well\par Final path: pT2N0, Mayi 3+4 tertiary pattern 5, < 3mm positive margin left posterior mid\par \par 5/12/21 Here for urgent f/u. Developed left epididymitis confirmed on US, started emprically on Bactrim. Now feeling better. +incontinence still from surgery. Otherwise feeling well.  [Hematuria - Gross] : gross hematuria [Incisional Drainage] : no incisional drainage [Incisional Pain] : no incisional pain

## 2021-06-07 ENCOUNTER — APPOINTMENT (OUTPATIENT)
Dept: UROLOGY | Facility: CLINIC | Age: 71
End: 2021-06-07
Payer: MEDICARE

## 2021-06-07 VITALS — SYSTOLIC BLOOD PRESSURE: 129 MMHG | TEMPERATURE: 97.9 F | DIASTOLIC BLOOD PRESSURE: 77 MMHG | HEART RATE: 64 BPM

## 2021-06-07 PROCEDURE — 99024 POSTOP FOLLOW-UP VISIT: CPT

## 2021-06-07 RX ORDER — OXYBUTYNIN CHLORIDE 5 MG/1
5 TABLET, EXTENDED RELEASE ORAL
Qty: 90 | Refills: 0 | Status: DISCONTINUED | COMMUNITY
Start: 2020-10-02 | End: 2021-06-07

## 2021-06-07 RX ORDER — OXYBUTYNIN CHLORIDE 5 MG/1
5 TABLET ORAL 3 TIMES DAILY
Qty: 6 | Refills: 0 | Status: DISCONTINUED | COMMUNITY
Start: 2021-04-23 | End: 2021-06-07

## 2021-06-07 RX ORDER — TAMSULOSIN HYDROCHLORIDE 0.4 MG/1
0.4 CAPSULE ORAL
Qty: 180 | Refills: 3 | Status: DISCONTINUED | COMMUNITY
Start: 2021-03-02 | End: 2021-06-07

## 2021-06-07 RX ORDER — TAMSULOSIN HYDROCHLORIDE 0.4 MG/1
0.4 CAPSULE ORAL
Qty: 90 | Refills: 3 | Status: DISCONTINUED | COMMUNITY
End: 2021-06-07

## 2021-06-07 RX ORDER — OXYBUTYNIN CHLORIDE 5 MG/1
5 TABLET ORAL
Refills: 0 | Status: DISCONTINUED | COMMUNITY
End: 2021-06-07

## 2021-06-07 RX ORDER — SULFAMETHOXAZOLE AND TRIMETHOPRIM 800; 160 MG/1; MG/1
800-160 TABLET ORAL
Qty: 28 | Refills: 0 | Status: DISCONTINUED | COMMUNITY
Start: 2021-05-04 | End: 2021-06-07

## 2021-06-07 NOTE — HISTORY OF PRESENT ILLNESS
[FreeTextEntry1] : 72yo male with h/o elevated PSA, prior negative biopsy years ago, had recent MRI showing PI-RADS 4 lesion. Here to discuss MRI fusion biopsy. \par \par MRI report: 82gm prostate, Lesion 1: PI-RADS 4 lesion right peripheral zone, Lesion 2: Pi-RADS 3 lesion left apex\par \par 4/05/21 Here for f/u. Underwent MRI fusion biopsy 3/23. Developed postop urinary retention but passed TOV last week.\par Path: 3/14 cores positive, [place] 3+4, 30% core involved. All positive cores on left side. \par He has significant LUTS at baseline. No ED.\par \par 4/26/21 s/p RALP, PLND 4/15/21. Had some hematuria/bladder spasms otherwise doing well\par Final path: pT2N0, Mayi 3+4 tertiary pattern 5, < 3mm positive margin left posterior mid\par \par 5/12/21 Here for urgent f/u. Developed left epididymitis confirmed on US, started emprically on Bactrim. Now feeling better. +incontinence still from surgery. Otherwise feeling well. \par \par 6/07/21 Here for f/u. Doing much better, still with some incontinence but improving. Otherwise feeling well.  [Urinary Incontinence] : urinary incontinence [Urinary Frequency] : urinary frequency [Nocturia] : nocturia [Dysuria] : no dysuria [Incisional Drainage] : no incisional drainage [Incisional Pain] : no incisional pain [None] : None

## 2021-06-07 NOTE — ASSESSMENT
[FreeTextEntry1] : 70yo male with diagnosed cT1c, Mayi 3+4 PCa, PSA 4.5 \par s/p RALP, PLND 4/15/21\par final path: pT2N0, Mayi 3+4 tertiary pattern 5, < 3mm positive margin left posterior mid\par Postop left epididymitis now resolved\par Check PSA\par Continue Kegels\par f/u 3 months\par

## 2021-06-08 ENCOUNTER — NON-APPOINTMENT (OUTPATIENT)
Age: 71
End: 2021-06-08

## 2021-06-08 LAB — PSA SERPL-MCNC: <0.01 NG/ML

## 2021-06-11 NOTE — ASSESSMENT
[FreeTextEntry1] : 71 year old male s/p RALP 4/15/21 seen today for bladder spasms and hematuria\par Jhaveri was flushed without any difficulty. \par Jhaveri draining clear yellow urine. No clots/hematuria noted. \par Pt made aware that it is normal to see mild hematuria after the surgery \par Oxybutynin 5mg sent to his pharmacy on file for bladder spasms\par Lidojet applied to tip of the penis for irritation caused by the jhaveri \par Advised patient if over the weekend, the jhaveri is not draining properly he should immediately go to emergency room to be evaluated \par Patient verbalized understanding. All questions were answered \par \par Patient has a f/u appointment for Monday 4/26

## 2021-06-11 NOTE — PHYSICAL EXAM
[General Appearance - Well Developed] : well developed [General Appearance - Well Nourished] : well nourished [Normal Appearance] : normal appearance [Well Groomed] : well groomed [General Appearance - In No Acute Distress] : no acute distress [] : no respiratory distress [Edema] : no peripheral edema [Respiration, Rhythm And Depth] : normal respiratory rhythm and effort [Exaggerated Use Of Accessory Muscles For Inspiration] : no accessory muscle use [Oriented To Time, Place, And Person] : oriented to person, place, and time [Affect] : the affect was normal [Mood] : the mood was normal [Not Anxious] : not anxious [Normal Station and Gait] : the gait and station were normal for the patient's age [No Focal Deficits] : no focal deficits [FreeTextEntry1] : Hinson in place draining clear yellow urine

## 2021-06-11 NOTE — HISTORY OF PRESENT ILLNESS
[None] : None [FreeTextEntry1] : 71 year old male s/p RALP 4/15/21 \par [employer] (Indu [id number]) was used during this office vist \par c/o urine leaking around the catheter and red-tinged urine in his Hinson bag \par Patient states the tip of his penis feels irritated \par Patient denies fever/chills, n/v, and pain \par \par  [Hematuria - Gross] : no gross hematuria

## 2021-09-13 ENCOUNTER — APPOINTMENT (OUTPATIENT)
Dept: UROLOGY | Facility: CLINIC | Age: 71
End: 2021-09-13
Payer: MEDICARE

## 2021-09-13 VITALS — TEMPERATURE: 97.6 F | SYSTOLIC BLOOD PRESSURE: 137 MMHG | DIASTOLIC BLOOD PRESSURE: 84 MMHG | HEART RATE: 71 BPM

## 2021-09-13 PROCEDURE — 99213 OFFICE O/P EST LOW 20 MIN: CPT

## 2021-09-13 NOTE — ASSESSMENT
[FreeTextEntry1] : 70yo male with diagnosed cT1c, Mayi 3+4 PCa, PSA 4.5 \par s/p RALP, PLND 4/15/21\par final path: pT2N0, Mayi 3+4 tertiary pattern 5, < 3mm positive margin left posterior mid\par Postop left epididymitis now resolved\par Check PSA\par Continue Kegels\par Trial Viagra 50mg prn \par f/u 3 months\par

## 2021-09-13 NOTE — HISTORY OF PRESENT ILLNESS
[FreeTextEntry1] : 70yo male with h/o elevated PSA, prior negative biopsy years ago, had recent MRI showing PI-RADS 4 lesion. Here to discuss MRI fusion biopsy. \par \par MRI report: 82gm prostate, Lesion 1: PI-RADS 4 lesion right peripheral zone, Lesion 2: Pi-RADS 3 lesion left apex\par \par 4/05/21 Here for f/u. Underwent MRI fusion biopsy 3/23. Developed postop urinary retention but passed TOV last week.\par Path: 3/14 cores positive, [place] 3+4, 30% core involved. All positive cores on left side. \par He has significant LUTS at baseline. No ED.\par \par 4/26/21 s/p RALP, PLND 4/15/21. Had some hematuria/bladder spasms otherwise doing well\par Final path: pT2N0, Mayi 3+4 tertiary pattern 5, < 3mm positive margin left posterior mid\par \par 5/12/21 Here for urgent f/u. Developed left epididymitis confirmed on US, started emprically on Bactrim. Now feeling better. +incontinence still from surgery. Otherwise feeling well. \par \par 6/07/21 Here for f/u. Doing much better, still with some incontinence but improving. Otherwise feeling well. \par \par 9/13/21 Here for f/u. Mild incontinence, 1 pad/day. No erections yet. Last PSA undetectable. [Urinary Incontinence] : urinary incontinence [Erectile Dysfunction] : Erectile Dysfunction [Dysuria] : no dysuria [Incisional Drainage] : no incisional drainage [Incisional Pain] : no incisional pain [None] : None

## 2021-09-13 NOTE — PHYSICAL EXAM
[General Appearance - Well Developed] : well developed [General Appearance - Well Nourished] : well nourished [Normal Appearance] : normal appearance [Well Groomed] : well groomed [General Appearance - In No Acute Distress] : no acute distress [Abdomen Soft] : soft [Abdomen Tenderness] : non-tender [Abdomen Hernia] : no hernia was discovered [Costovertebral Angle Tenderness] : no ~M costovertebral angle tenderness [FreeTextEntry1] : incisions well healed [Oriented To Time, Place, And Person] : oriented to person, place, and time [Affect] : the affect was normal [Mood] : the mood was normal [Not Anxious] : not anxious [Normal Station and Gait] : the gait and station were normal for the patient's age [No Focal Deficits] : no focal deficits

## 2021-09-14 ENCOUNTER — NON-APPOINTMENT (OUTPATIENT)
Age: 71
End: 2021-09-14

## 2021-09-14 LAB — PSA SERPL-MCNC: <0.01 NG/ML

## 2021-09-17 ENCOUNTER — NON-APPOINTMENT (OUTPATIENT)
Age: 71
End: 2021-09-17

## 2021-12-20 ENCOUNTER — APPOINTMENT (OUTPATIENT)
Dept: UROLOGY | Facility: CLINIC | Age: 71
End: 2021-12-20
Payer: MEDICARE

## 2021-12-20 VITALS — DIASTOLIC BLOOD PRESSURE: 76 MMHG | TEMPERATURE: 97.6 F | HEART RATE: 81 BPM | SYSTOLIC BLOOD PRESSURE: 124 MMHG

## 2021-12-20 LAB — PSA SERPL-MCNC: <0.01 NG/ML

## 2021-12-20 PROCEDURE — 99213 OFFICE O/P EST LOW 20 MIN: CPT

## 2021-12-20 NOTE — HISTORY OF PRESENT ILLNESS
[FreeTextEntry1] : 70yo male with h/o elevated PSA, prior negative biopsy years ago, had recent MRI showing PI-RADS 4 lesion. Here to discuss MRI fusion biopsy. \par \par MRI report: 82gm prostate, Lesion 1: PI-RADS 4 lesion right peripheral zone, Lesion 2: Pi-RADS 3 lesion left apex\par \par 4/05/21 Here for f/u. Underwent MRI fusion biopsy 3/23. Developed postop urinary retention but passed TOV last week.\par Path: 3/14 cores positive, [place] 3+4, 30% core involved. All positive cores on left side. \par He has significant LUTS at baseline. No ED.\par \par 4/26/21 s/p RALP, PLND 4/15/21. Had some hematuria/bladder spasms otherwise doing well\par Final path: pT2N0, Mayi 3+4 tertiary pattern 5, < 3mm positive margin left posterior mid\par \par 5/12/21 Here for urgent f/u. Developed left epididymitis confirmed on US, started emprically on Bactrim. Now feeling better. +incontinence still from surgery. Otherwise feeling well. \par \par 6/07/21 Here for f/u. Doing much better, still with some incontinence but improving. Otherwise feeling well. \par \par 9/13/21 Here for f/u. Mild incontinence, 1 pad/day. No erections yet. Last PSA undetectable.\par \par 12/20/21 Here for f/u. Minimal incontinence, no pads. Sildenafil 50mg helped modestly, interested in increasing dosage. Last PSA undetectable.  [Urinary Incontinence] : no urinary incontinence [Erectile Dysfunction] : Erectile Dysfunction [Dysuria] : no dysuria [Incisional Drainage] : no incisional drainage [Incisional Pain] : no incisional pain [None] : None

## 2021-12-20 NOTE — ASSESSMENT
[FreeTextEntry1] : 72yo male with diagnosed cT1c, Mayi 3+4 PCa, PSA 4.5 \par s/p RALP, PLND 4/15/21\par final path: pT2N0, Mayi 3+4 tertiary pattern 5, < 3mm positive margin left posterior mid\par Check PSA\par Increase Viagra 100mg prn \par f/u 3 months\par

## 2021-12-21 ENCOUNTER — NON-APPOINTMENT (OUTPATIENT)
Age: 71
End: 2021-12-21

## 2022-01-10 ENCOUNTER — LABORATORY RESULT (OUTPATIENT)
Age: 72
End: 2022-01-10

## 2022-02-07 ENCOUNTER — APPOINTMENT (OUTPATIENT)
Dept: UROLOGY | Facility: CLINIC | Age: 72
End: 2022-02-07

## 2022-03-28 ENCOUNTER — APPOINTMENT (OUTPATIENT)
Dept: UROLOGY | Facility: CLINIC | Age: 72
End: 2022-03-28
Payer: MEDICARE

## 2022-03-28 VITALS — HEART RATE: 77 BPM | DIASTOLIC BLOOD PRESSURE: 78 MMHG | TEMPERATURE: 97.6 F | SYSTOLIC BLOOD PRESSURE: 153 MMHG

## 2022-03-28 DIAGNOSIS — R10.9 UNSPECIFIED ABDOMINAL PAIN: ICD-10-CM

## 2022-03-28 PROCEDURE — 99213 OFFICE O/P EST LOW 20 MIN: CPT

## 2022-03-28 NOTE — REASON FOR VISIT
[Follow-up Visit ___] : a follow-up visit  for [unfilled] [[employer] ] : provided by [employer]   [Time Spent: ____ minutes] : Total time spent using  services: [unfilled] minutes. The patient's primary language is not English thus required  services.

## 2022-03-28 NOTE — ASSESSMENT
[FreeTextEntry1] : 71yo male with diagnosed cT1c, Mayi 3+4 PCa, PSA 4.5 \par s/p RALP, PLND 4/15/21\par final path: pT2N0, Mayi 3+4 tertiary pattern 5, < 3mm positive margin left posterior mid\par PSA has been undetectable. Repeat PSA today\par Continue Viagra 100mg prn \par Abdominal/groin pain for 2 months\par Normal exam\par Check CT abd/pelvis \par F/u 3 months pending test results\par

## 2022-03-28 NOTE — PHYSICAL EXAM
[General Appearance - Well Developed] : well developed [General Appearance - Well Nourished] : well nourished [Normal Appearance] : normal appearance [Well Groomed] : well groomed [General Appearance - In No Acute Distress] : no acute distress [Abdomen Soft] : soft [Abdomen Tenderness] : non-tender [Abdomen Mass (___ Cm)] : no abdominal mass palpated [Abdomen Hernia] : no hernia was discovered [Costovertebral Angle Tenderness] : no ~M costovertebral angle tenderness [FreeTextEntry1] : incisions well healed [Oriented To Time, Place, And Person] : oriented to person, place, and time [Affect] : the affect was normal [Mood] : the mood was normal [Not Anxious] : not anxious [Normal Station and Gait] : the gait and station were normal for the patient's age [No Focal Deficits] : no focal deficits

## 2022-03-28 NOTE — HISTORY OF PRESENT ILLNESS
[FreeTextEntry1] : 70yo male with h/o elevated PSA, prior negative biopsy years ago, had recent MRI showing PI-RADS 4 lesion. Here to discuss MRI fusion biopsy. \par \par MRI report: 82gm prostate, Lesion 1: PI-RADS 4 lesion right peripheral zone, Lesion 2: Pi-RADS 3 lesion left apex\par \par 4/05/21 Here for f/u. Underwent MRI fusion biopsy 3/23. Developed postop urinary retention but passed TOV last week.\par Path: 3/14 cores positive, [place] 3+4, 30% core involved. All positive cores on left side. \par He has significant LUTS at baseline. No ED.\par \par 4/26/21 s/p RALP, PLND 4/15/21. Had some hematuria/bladder spasms otherwise doing well\par Final path: pT2N0, Mayi 3+4 tertiary pattern 5, < 3mm positive margin left posterior mid\par \par 5/12/21 Here for urgent f/u. Developed left epididymitis confirmed on US, started emprically on Bactrim. Now feeling better. +incontinence still from surgery. Otherwise feeling well. \par \par 6/07/21 Here for f/u. Doing much better, still with some incontinence but improving. Otherwise feeling well. \par \par 9/13/21 Here for f/u. Mild incontinence, 1 pad/day. No erections yet. Last PSA undetectable.\par \par 12/20/21 Here for f/u. Minimal incontinence, no pads. Sildenafil 50mg helped modestly, interested in increasing dosage. Last PSA undetectable. \par \par 3/28/22 Here for f/u. Primary complaint is bilateral groin pain which he had before RALP. Had CT in 2019 which showed no hernia. Pain 8/10, intermittent, worse with sitting. Last PSA undetectable.  [Urinary Incontinence] : no urinary incontinence [Erectile Dysfunction] : Erectile Dysfunction [Dysuria] : no dysuria [Incisional Drainage] : no incisional drainage [Incisional Pain] : no incisional pain [8] : 8 [de-identified] : bilateral groin

## 2022-03-29 LAB — PSA SERPL-MCNC: <0.01 NG/ML

## 2022-03-31 ENCOUNTER — NON-APPOINTMENT (OUTPATIENT)
Age: 72
End: 2022-03-31

## 2022-04-08 ENCOUNTER — NON-APPOINTMENT (OUTPATIENT)
Age: 72
End: 2022-04-08

## 2022-04-12 ENCOUNTER — NON-APPOINTMENT (OUTPATIENT)
Age: 72
End: 2022-04-12

## 2022-06-20 ENCOUNTER — APPOINTMENT (OUTPATIENT)
Dept: UROLOGY | Facility: CLINIC | Age: 72
End: 2022-06-20

## 2022-08-03 ENCOUNTER — APPOINTMENT (OUTPATIENT)
Dept: UROLOGY | Facility: CLINIC | Age: 72
End: 2022-08-03

## 2022-08-03 VITALS — TEMPERATURE: 97.8 F | HEART RATE: 78 BPM | SYSTOLIC BLOOD PRESSURE: 114 MMHG | DIASTOLIC BLOOD PRESSURE: 75 MMHG

## 2022-08-03 PROCEDURE — 99213 OFFICE O/P EST LOW 20 MIN: CPT

## 2022-08-03 NOTE — HISTORY OF PRESENT ILLNESS
[Erectile Dysfunction] : Erectile Dysfunction [8] : 8 [FreeTextEntry1] : 70yo male with h/o elevated PSA, prior negative biopsy years ago, had recent MRI showing PI-RADS 4 lesion. Here to discuss MRI fusion biopsy. \par \par MRI report: 82gm prostate, Lesion 1: PI-RADS 4 lesion right peripheral zone, Lesion 2: Pi-RADS 3 lesion left apex\par \par 4/05/21 Here for f/u. Underwent MRI fusion biopsy 3/23. Developed postop urinary retention but passed TOV last week.\par Path: 3/14 cores positive, [place] 3+4, 30% core involved. All positive cores on left side. \par He has significant LUTS at baseline. No ED.\par \par 4/26/21 s/p RALP, PLND 4/15/21. Had some hematuria/bladder spasms otherwise doing well\par Final path: pT2N0, Mayi 3+4 tertiary pattern 5, < 3mm positive margin left posterior mid\par \par 5/12/21 Here for urgent f/u. Developed left epididymitis confirmed on US, started emprically on Bactrim. Now feeling better. +incontinence still from surgery. Otherwise feeling well. \par \par 6/07/21 Here for f/u. Doing much better, still with some incontinence but improving. Otherwise feeling well. \par \par 9/13/21 Here for f/u. Mild incontinence, 1 pad/day. No erections yet. Last PSA undetectable.\par \par 12/20/21 Here for f/u. Minimal incontinence, no pads. Sildenafil 50mg helped modestly, interested in increasing dosage. Last PSA undetectable. \par \par 3/28/22 Here for f/u. Primary complaint is bilateral groin pain which he had before RALP. Had CT in 2019 which showed no hernia. Pain 8/10, intermittent, worse with sitting. Last PSA undetectable. \par \par 8/03/22 Here for f/u. Last PSA undetectable. No further pain.  [Urinary Incontinence] : no urinary incontinence [Dysuria] : no dysuria [Incisional Drainage] : no incisional drainage [Incisional Pain] : no incisional pain [de-identified] : bilateral groin

## 2022-08-03 NOTE — PHYSICAL EXAM
[General Appearance - Well Developed] : well developed [General Appearance - Well Nourished] : well nourished [Normal Appearance] : normal appearance [Well Groomed] : well groomed [General Appearance - In No Acute Distress] : no acute distress [Abdomen Tenderness] : non-tender [Abdomen Soft] : soft [Abdomen Mass (___ Cm)] : no abdominal mass palpated [Abdomen Hernia] : no hernia was discovered [Costovertebral Angle Tenderness] : no ~M costovertebral angle tenderness [Oriented To Time, Place, And Person] : oriented to person, place, and time [Affect] : the affect was normal [Mood] : the mood was normal [Not Anxious] : not anxious [Normal Station and Gait] : the gait and station were normal for the patient's age [No Focal Deficits] : no focal deficits [FreeTextEntry1] : incisions well healed

## 2022-08-03 NOTE — ASSESSMENT
[FreeTextEntry1] : 71yo male with diagnosed cT1c, Mayi 3+4 PCa, PSA 4.5 \par s/p RALP, PLND 4/15/21\par final path: pT2N0, Mayi 3+4 tertiary pattern 5, < 3mm positive margin left posterior mid\par PSA has been undetectable. Repeat PSA today\par Continue Viagra 100mg prn. Renewed today\par F/u 6 months\par

## 2022-08-04 LAB — PSA SERPL-MCNC: <0.01 NG/ML

## 2022-08-05 ENCOUNTER — NON-APPOINTMENT (OUTPATIENT)
Age: 72
End: 2022-08-05

## 2022-08-17 ENCOUNTER — NON-APPOINTMENT (OUTPATIENT)
Age: 72
End: 2022-08-17

## 2022-08-25 ENCOUNTER — NON-APPOINTMENT (OUTPATIENT)
Age: 72
End: 2022-08-25

## 2022-08-26 NOTE — ED ADULT NURSE NOTE - NSIMPLEMENTINTERV_GEN_ALL_ED
show
Implemented All Universal Safety Interventions:  [place] to call system. Call bell, personal items and telephone within reach. Instruct patient to call for assistance. Room bathroom lighting operational. Non-slip footwear when patient is off stretcher. Physically safe environment: no spills, clutter or unnecessary equipment. Stretcher in lowest position, wheels locked, appropriate side rails in place.

## 2023-03-08 ENCOUNTER — APPOINTMENT (OUTPATIENT)
Dept: UROLOGY | Facility: CLINIC | Age: 73
End: 2023-03-08
Payer: MEDICARE

## 2023-03-08 ENCOUNTER — LABORATORY RESULT (OUTPATIENT)
Age: 73
End: 2023-03-08

## 2023-03-08 VITALS — TEMPERATURE: 97.7 F | SYSTOLIC BLOOD PRESSURE: 130 MMHG | DIASTOLIC BLOOD PRESSURE: 79 MMHG | HEART RATE: 86 BPM

## 2023-03-08 DIAGNOSIS — N52.31 ERECTILE DYSFUNCTION FOLLOWING RADICAL PROSTATECTOMY: ICD-10-CM

## 2023-03-08 DIAGNOSIS — Z85.46 PERSONAL HISTORY OF MALIGNANT NEOPLASM OF PROSTATE: ICD-10-CM

## 2023-03-08 PROCEDURE — 99213 OFFICE O/P EST LOW 20 MIN: CPT

## 2023-03-08 NOTE — PHYSICAL EXAM
[General Appearance - Well Developed] : well developed [General Appearance - Well Nourished] : well nourished [Normal Appearance] : normal appearance [Well Groomed] : well groomed [General Appearance - In No Acute Distress] : no acute distress [Abdomen Soft] : soft [Abdomen Tenderness] : non-tender [Abdomen Mass (___ Cm)] : no abdominal mass palpated [Abdomen Hernia] : no hernia was discovered [Costovertebral Angle Tenderness] : no ~M costovertebral angle tenderness [Oriented To Time, Place, And Person] : oriented to person, place, and time [Affect] : the affect was normal [Mood] : the mood was normal [Not Anxious] : not anxious [Normal Station and Gait] : the gait and station were normal for the patient's age [No Focal Deficits] : no focal deficits [FreeTextEntry1] : incisions well healed

## 2023-03-08 NOTE — ASSESSMENT
[FreeTextEntry1] : 72yo male with diagnosed cT1c, Mayi 3+4 PCa, PSA 4.5 \par s/p RALP, PLND 4/15/21\par final path: pT2N0, Mayi 3+4 tertiary pattern 5, < 3mm positive margin left posterior mid\par PSA has been undetectable. Repeat PSA today\par Continue Viagra 100mg prn. Renewed today\par F/u 6 months\par

## 2023-03-08 NOTE — HISTORY OF PRESENT ILLNESS
[Erectile Dysfunction] : Erectile Dysfunction [8] : 8 [FreeTextEntry1] : 72yo male with h/o elevated PSA, prior negative biopsy years ago, had recent MRI showing PI-RADS 4 lesion. Here to discuss MRI fusion biopsy. \par \par MRI report: 82gm prostate, Lesion 1: PI-RADS 4 lesion right peripheral zone, Lesion 2: Pi-RADS 3 lesion left apex\par \par 4/05/21 Here for f/u. Underwent MRI fusion biopsy 3/23. Developed postop urinary retention but passed TOV last week.\par Path: 3/14 cores positive, [place] 3+4, 30% core involved. All positive cores on left side. \par He has significant LUTS at baseline. No ED.\par \par 4/26/21 s/p RALP, PLND 4/15/21. Had some hematuria/bladder spasms otherwise doing well\par Final path: pT2N0, Mayi 3+4 tertiary pattern 5, < 3mm positive margin left posterior mid\par \par 5/12/21 Here for urgent f/u. Developed left epididymitis confirmed on US, started emprically on Bactrim. Now feeling better. +incontinence still from surgery. Otherwise feeling well. \par \par 6/07/21 Here for f/u. Doing much better, still with some incontinence but improving. Otherwise feeling well. \par \par 9/13/21 Here for f/u. Mild incontinence, 1 pad/day. No erections yet. Last PSA undetectable.\par \par 12/20/21 Here for f/u. Minimal incontinence, no pads. Sildenafil 50mg helped modestly, interested in increasing dosage. Last PSA undetectable. \par \par 3/28/22 Here for f/u. Primary complaint is bilateral groin pain which he had before RALP. Had CT in 2019 which showed no hernia. Pain 8/10, intermittent, worse with sitting. Last PSA undetectable. \par \par 8/03/22 Here for f/u. Last PSA undetectable. No further pain. \par \par 3/8/23: Here for f/u. Last PSA undetectable. Doing well. Reports good continence. Erections 6/10 on Sildenafil 100mg. Not interested in other options at this time. [Urinary Incontinence] : no urinary incontinence [Dysuria] : no dysuria [Incisional Drainage] : no incisional drainage [Incisional Pain] : no incisional pain [de-identified] : bilateral groin

## 2023-03-19 ENCOUNTER — INPATIENT (INPATIENT)
Facility: HOSPITAL | Age: 73
LOS: 2 days | Discharge: HOME | DRG: 872 | End: 2023-03-22
Attending: INTERNAL MEDICINE | Admitting: INTERNAL MEDICINE
Payer: MEDICARE

## 2023-03-19 VITALS
HEIGHT: 67 IN | RESPIRATION RATE: 20 BRPM | OXYGEN SATURATION: 95 % | WEIGHT: 176.37 LBS | HEART RATE: 111 BPM | DIASTOLIC BLOOD PRESSURE: 83 MMHG | SYSTOLIC BLOOD PRESSURE: 130 MMHG | TEMPERATURE: 101 F

## 2023-03-19 DIAGNOSIS — Z29.9 ENCOUNTER FOR PROPHYLACTIC MEASURES, UNSPECIFIED: ICD-10-CM

## 2023-03-19 DIAGNOSIS — Z86.79 PERSONAL HISTORY OF OTHER DISEASES OF THE CIRCULATORY SYSTEM: Chronic | ICD-10-CM

## 2023-03-19 DIAGNOSIS — Z90.79 ACQUIRED ABSENCE OF OTHER GENITAL ORGAN(S): Chronic | ICD-10-CM

## 2023-03-19 DIAGNOSIS — A41.9 SEPSIS, UNSPECIFIED ORGANISM: ICD-10-CM

## 2023-03-19 DIAGNOSIS — Z90.49 ACQUIRED ABSENCE OF OTHER SPECIFIED PARTS OF DIGESTIVE TRACT: Chronic | ICD-10-CM

## 2023-03-19 DIAGNOSIS — E78.5 HYPERLIPIDEMIA, UNSPECIFIED: ICD-10-CM

## 2023-03-19 DIAGNOSIS — R50.9 FEVER, UNSPECIFIED: ICD-10-CM

## 2023-03-19 LAB
ALBUMIN SERPL ELPH-MCNC: 3.7 G/DL — SIGNIFICANT CHANGE UP (ref 3.5–5)
ALP SERPL-CCNC: 77 U/L — SIGNIFICANT CHANGE UP (ref 40–120)
ALT FLD-CCNC: 50 U/L DA — SIGNIFICANT CHANGE UP (ref 10–60)
ANION GAP SERPL CALC-SCNC: 7 MMOL/L — SIGNIFICANT CHANGE UP (ref 5–17)
APPEARANCE UR: CLEAR — SIGNIFICANT CHANGE UP
APTT BLD: 28.6 SEC — SIGNIFICANT CHANGE UP (ref 27.5–35.5)
AST SERPL-CCNC: 20 U/L — SIGNIFICANT CHANGE UP (ref 10–40)
BACTERIA # UR AUTO: ABNORMAL /HPF
BASOPHILS # BLD AUTO: 0.07 K/UL — SIGNIFICANT CHANGE UP (ref 0–0.2)
BASOPHILS NFR BLD AUTO: 0.4 % — SIGNIFICANT CHANGE UP (ref 0–2)
BILIRUB SERPL-MCNC: 1 MG/DL — SIGNIFICANT CHANGE UP (ref 0.2–1.2)
BILIRUB UR-MCNC: NEGATIVE — SIGNIFICANT CHANGE UP
BUN SERPL-MCNC: 18 MG/DL — SIGNIFICANT CHANGE UP (ref 7–18)
CALCIUM SERPL-MCNC: 9 MG/DL — SIGNIFICANT CHANGE UP (ref 8.4–10.5)
CHLORIDE SERPL-SCNC: 104 MMOL/L — SIGNIFICANT CHANGE UP (ref 96–108)
CO2 SERPL-SCNC: 24 MMOL/L — SIGNIFICANT CHANGE UP (ref 22–31)
COLOR SPEC: YELLOW — SIGNIFICANT CHANGE UP
CREAT SERPL-MCNC: 1.06 MG/DL — SIGNIFICANT CHANGE UP (ref 0.5–1.3)
DIFF PNL FLD: ABNORMAL
EGFR: 75 ML/MIN/1.73M2 — SIGNIFICANT CHANGE UP
EOSINOPHIL # BLD AUTO: 0.14 K/UL — SIGNIFICANT CHANGE UP (ref 0–0.5)
EOSINOPHIL NFR BLD AUTO: 0.7 % — SIGNIFICANT CHANGE UP (ref 0–6)
EPI CELLS # UR: ABNORMAL /HPF
GLUCOSE SERPL-MCNC: 118 MG/DL — HIGH (ref 70–99)
GLUCOSE UR QL: NEGATIVE — SIGNIFICANT CHANGE UP
HCT VFR BLD CALC: 43.7 % — SIGNIFICANT CHANGE UP (ref 39–50)
HGB BLD-MCNC: 14.7 G/DL — SIGNIFICANT CHANGE UP (ref 13–17)
IMM GRANULOCYTES NFR BLD AUTO: 0.7 % — SIGNIFICANT CHANGE UP (ref 0–0.9)
INR BLD: 1.18 RATIO — HIGH (ref 0.88–1.16)
KETONES UR-MCNC: NEGATIVE — SIGNIFICANT CHANGE UP
LACTATE SERPL-SCNC: 2 MMOL/L — SIGNIFICANT CHANGE UP (ref 0.7–2)
LEUKOCYTE ESTERASE UR-ACNC: NEGATIVE — SIGNIFICANT CHANGE UP
LYMPHOCYTES # BLD AUTO: 1.76 K/UL — SIGNIFICANT CHANGE UP (ref 1–3.3)
LYMPHOCYTES # BLD AUTO: 9.1 % — LOW (ref 13–44)
MCHC RBC-ENTMCNC: 30.2 PG — SIGNIFICANT CHANGE UP (ref 27–34)
MCHC RBC-ENTMCNC: 33.6 GM/DL — SIGNIFICANT CHANGE UP (ref 32–36)
MCV RBC AUTO: 89.9 FL — SIGNIFICANT CHANGE UP (ref 80–100)
MONOCYTES # BLD AUTO: 1.28 K/UL — HIGH (ref 0–0.9)
MONOCYTES NFR BLD AUTO: 6.6 % — SIGNIFICANT CHANGE UP (ref 2–14)
NEUTROPHILS # BLD AUTO: 15.87 K/UL — HIGH (ref 1.8–7.4)
NEUTROPHILS NFR BLD AUTO: 82.5 % — HIGH (ref 43–77)
NITRITE UR-MCNC: NEGATIVE — SIGNIFICANT CHANGE UP
NRBC # BLD: 0 /100 WBCS — SIGNIFICANT CHANGE UP (ref 0–0)
PH UR: 6.5 — SIGNIFICANT CHANGE UP (ref 5–8)
PLATELET # BLD AUTO: 139 K/UL — LOW (ref 150–400)
POTASSIUM SERPL-MCNC: 3.9 MMOL/L — SIGNIFICANT CHANGE UP (ref 3.5–5.3)
POTASSIUM SERPL-SCNC: 3.9 MMOL/L — SIGNIFICANT CHANGE UP (ref 3.5–5.3)
PROT SERPL-MCNC: 7.7 G/DL — SIGNIFICANT CHANGE UP (ref 6–8.3)
PROT UR-MCNC: 30 MG/DL
PROTHROM AB SERPL-ACNC: 14.1 SEC — HIGH (ref 10.5–13.4)
RAPID RVP RESULT: SIGNIFICANT CHANGE UP
RBC # BLD: 4.86 M/UL — SIGNIFICANT CHANGE UP (ref 4.2–5.8)
RBC # FLD: 13.2 % — SIGNIFICANT CHANGE UP (ref 10.3–14.5)
RBC CASTS # UR COMP ASSIST: ABNORMAL /HPF (ref 0–2)
SARS-COV-2 RNA SPEC QL NAA+PROBE: SIGNIFICANT CHANGE UP
SODIUM SERPL-SCNC: 135 MMOL/L — SIGNIFICANT CHANGE UP (ref 135–145)
SP GR SPEC: 1.01 — SIGNIFICANT CHANGE UP (ref 1.01–1.02)
UROBILINOGEN FLD QL: NEGATIVE — SIGNIFICANT CHANGE UP
WBC # BLD: 19.25 K/UL — HIGH (ref 3.8–10.5)
WBC # FLD AUTO: 19.25 K/UL — HIGH (ref 3.8–10.5)
WBC UR QL: SIGNIFICANT CHANGE UP /HPF (ref 0–5)

## 2023-03-19 PROCEDURE — 71045 X-RAY EXAM CHEST 1 VIEW: CPT | Mod: 26

## 2023-03-19 PROCEDURE — 99285 EMERGENCY DEPT VISIT HI MDM: CPT

## 2023-03-19 PROCEDURE — 93010 ELECTROCARDIOGRAM REPORT: CPT

## 2023-03-19 RX ORDER — PIPERACILLIN AND TAZOBACTAM 4; .5 G/20ML; G/20ML
3.38 INJECTION, POWDER, LYOPHILIZED, FOR SOLUTION INTRAVENOUS ONCE
Refills: 0 | Status: COMPLETED | OUTPATIENT
Start: 2023-03-19 | End: 2023-03-19

## 2023-03-19 RX ORDER — ATORVASTATIN CALCIUM 80 MG/1
1 TABLET, FILM COATED ORAL
Qty: 0 | Refills: 0 | DISCHARGE

## 2023-03-19 RX ORDER — SODIUM CHLORIDE 9 MG/ML
1000 INJECTION INTRAMUSCULAR; INTRAVENOUS; SUBCUTANEOUS ONCE
Refills: 0 | Status: COMPLETED | OUTPATIENT
Start: 2023-03-19 | End: 2023-03-19

## 2023-03-19 RX ORDER — ACETAMINOPHEN 500 MG
650 TABLET ORAL ONCE
Refills: 0 | Status: COMPLETED | OUTPATIENT
Start: 2023-03-19 | End: 2023-03-19

## 2023-03-19 RX ORDER — ASPIRIN/CALCIUM CARB/MAGNESIUM 324 MG
1 TABLET ORAL
Qty: 0 | Refills: 0 | DISCHARGE

## 2023-03-19 RX ORDER — ASPIRIN/CALCIUM CARB/MAGNESIUM 324 MG
81 TABLET ORAL DAILY
Refills: 0 | Status: DISCONTINUED | OUTPATIENT
Start: 2023-03-19 | End: 2023-03-22

## 2023-03-19 RX ORDER — ATORVASTATIN CALCIUM 80 MG/1
40 TABLET, FILM COATED ORAL AT BEDTIME
Refills: 0 | Status: DISCONTINUED | OUTPATIENT
Start: 2023-03-19 | End: 2023-03-22

## 2023-03-19 RX ORDER — TAMSULOSIN HYDROCHLORIDE 0.4 MG/1
1 CAPSULE ORAL
Qty: 0 | Refills: 0 | DISCHARGE

## 2023-03-19 RX ADMIN — SODIUM CHLORIDE 1000 MILLILITER(S): 9 INJECTION INTRAMUSCULAR; INTRAVENOUS; SUBCUTANEOUS at 11:21

## 2023-03-19 RX ADMIN — SODIUM CHLORIDE 1000 MILLILITER(S): 9 INJECTION INTRAMUSCULAR; INTRAVENOUS; SUBCUTANEOUS at 12:21

## 2023-03-19 RX ADMIN — Medication 81 MILLIGRAM(S): at 22:37

## 2023-03-19 RX ADMIN — PIPERACILLIN AND TAZOBACTAM 200 GRAM(S): 4; .5 INJECTION, POWDER, LYOPHILIZED, FOR SOLUTION INTRAVENOUS at 15:43

## 2023-03-19 RX ADMIN — Medication 650 MILLIGRAM(S): at 11:21

## 2023-03-19 RX ADMIN — ATORVASTATIN CALCIUM 40 MILLIGRAM(S): 80 TABLET, FILM COATED ORAL at 22:37

## 2023-03-19 RX ADMIN — Medication 650 MILLIGRAM(S): at 12:21

## 2023-03-19 NOTE — ED PROVIDER NOTE - PHYSICAL EXAMINATION
Exam:  General: Patient well appearing, febrile, tachycardic otherwise vital signs within normal limits  HEENT: airway patent with moist mucous membranes  Cardiac: regular tachycardia S1/S2 with strong peripheral pulses  Respiratory: lungs clear without respiratory distress  GI: abdomen soft, non tender, non distended  : no CVA tenderness bilaterally  Neuro: no gross neurologic deficits  Skin: warm, well perfused  Psych: normal mood and affect

## 2023-03-19 NOTE — ED ADULT NURSE NOTE - OBJECTIVE STATEMENT
Pt c/o right lower back pain with headache x yesterday. No acute distress noted, denies chest pain, no SOB noted. EKG completed.

## 2023-03-19 NOTE — H&P ADULT - ASSESSMENT
73 year old male with PMH of HLD presents with generalized weakness Patient was found to be septic 2/2 unknown source. Admitted for further management

## 2023-03-19 NOTE — ED ADULT NURSE REASSESSMENT NOTE - NS ED NURSE REASSESS COMMENT FT1
Pt resting in bed, A&OX3, admitted to medicine service, awaiting floor bed. No acute distress noted, denies chest pain, no SOB noted.

## 2023-03-19 NOTE — H&P ADULT - PROBLEM SELECTOR PLAN 4
IMPROVE VTE Individual Risk Assessment          RISK                                                          Points  [  ] Previous VTE                                                3  [  ] Thrombophilia                                             2  [  ] Lower limb paralysis                                   2        (unable to hold up >15 seconds)    [  ] Current Cancer                                             2         (within 6 months)  [ x ] Immobilization > 24 hrs                              1  [  ] ICU/CCU stay > 24 hours                             1  [ x ] Age > 60                                                         1    IMPROVE VTE Score:         [     2    ]      Lovenox 40mg

## 2023-03-19 NOTE — H&P ADULT - NSHPLABSRESULTS_GEN_ALL_CORE
LABS:                        14.7   .25 )-----------( 139      ( 19 Mar 2023 11:05 )             43.7         135  |  104  |  18  ----------------------------<  118<H>  3.9   |  24  |  1.06    Ca    9.0      19 Mar 2023 11:05    TPro  7.7  /  Alb  3.7  /  TBili  1.0  /  DBili  x   /  AST  20  /  ALT  50  /  AlkPhos  77      PT/INR - ( 19 Mar 2023 11:05 )   PT: 14.1 sec;   INR: 1.18 ratio         PTT - ( 19 Mar 2023 11:05 )  PTT:28.6 sec  Urinalysis Basic - ( 19 Mar 2023 11:05 )    Color: Yellow / Appearance: Clear / S.010 / pH: x  Gluc: x / Ketone: Negative  / Bili: Negative / Urobili: Negative   Blood: x / Protein: 30 mg/dL / Nitrite: Negative   Leuk Esterase: Negative / RBC: 2-5 /HPF / WBC 0-2 /HPF   Sq Epi: x / Non Sq Epi: Occasional /HPF / Bacteria: Trace /HPF      LIVER FUNCTIONS - ( 19 Mar 2023 11:05 )  Alb: 3.7 g/dL / Pro: 7.7 g/dL / ALK PHOS: 77 U/L / ALT: 50 U/L DA / AST: 20 U/L / GGT: x           Lactate, Blood: 2.0 mmol/L (23 @ 11:05)

## 2023-03-19 NOTE — ED ADULT NURSE NOTE - ED CARDIAC RATE
[facility]  [address], [address] [place] [address]  [phone]    Yanet Beebe (: 1985) is a 30 y.o. female, established patient, here for evaluation of the following chief complaint(s): HTN    SUBJECTIVE:    Pt would like to be evaluated for the problem(s) listed above:    HTN:  Pt seen in the ER with headache and significant BP elevation. Previously well controlled on prinzide which was switched to labetalol as she was planning on getting pregnant. States that she has been restated on prinzide and BP back to normal.  Denies any headache, dizziness, n/v.    Review of systems:    A comprehensive review of systems was negative except for that written in the History of Present Illness. PHYSICAL EXAM:    General: alert, cooperative, no distress   Mental  status: mental status: alert, oriented to person, place, and time, normal mood, behavior, speech, dress, motor activity, and thought processes   Resp: resp: normal effort and no respiratory distress   Neuro: neuro: no gross deficits   Skin: skin: no discoloration or lesions of concern on visible areas   Due to this being a TeleHealth evaluation, many elements of the physical examination are unable to be assessed. ASSESSMENT/PLAN:      ICD-10-CM ICD-9-CM    1. Essential hypertension  I10 401.9 lisinopril-hydroCHLOROthiazide (PRINZIDE, ZESTORETIC) 20-12.5 mg per tablet        1. Essential hypertension    - lisinopril-hydroCHLOROthiazide (PRINZIDE, ZESTORETIC) 20-12.5 mg per tablet; Take 1 Tablet by mouth daily. - Continue to monitor at home      Return if symptoms worsen or fail to improve. We discussed the expected course, resolution and complications of the diagnosis(es) in detail. Patient was in [place] at the time of consultation. Medication risks, benefits, costs, interactions, and alternatives were discussed as indicated.   I advised her to contact the office if her condition worsens, changes or fails to improve as anticipated. She expressed understanding with the diagnosis(es) and plan. Patient understands that this encounter was a temporary measure, and the importance of further follow up and examination was emphasized. Patient verbalized understanding. Candie Prieto is being evaluated by a Virtual Visit (video visit) encounter to address concerns as mentioned above. A caregiver was present when appropriate. Due to this being a TeleHealth encounter (During NQUKN-10 public health emergency), evaluation of the following organ systems was limited: Vitals/Constitutional/EENT/Resp/CV/GI//MS/Neuro/Skin/Heme-Lymph-Imm. Pursuant to the emergency declaration under the [address] [place] [address] authority and the [employer] Act, this Virtual Visit was conducted with patient's (and/or legal guardian's) consent, to reduce the patient's risk of exposure to COVID-19 and provide necessary medical care. The patient (and/or legal guardian) has also been advised to contact this office for worsening conditions or problems, and seek emergency medical treatment and/or call 911 if deemed necessary. Patient identification was verified at the start of the visit: YES    Services were provided through a video synchronous discussion virtually to substitute for in-person clinic visit. Patient was located at home and provider was located in office or at home. An electronic signature was used to authenticate this note. -- Ivone Salgado MD     CPT Codes 21689-88107 for Established Patients may apply to this Telehealth Visit. POS code: 18.   Modifier GT tachycardic

## 2023-03-19 NOTE — ED PROVIDER NOTE - CLINICAL SUMMARY MEDICAL DECISION MAKING FREE TEXT BOX
Patient presenting here with infectious symptoms found to be febrile.  Will initiate sepsis bundle given patient's age.  Antibiotic regimen to be determined based off of findings of potential source.  No evidence on exam of abdominal surgical pathology at this time.

## 2023-03-19 NOTE — H&P ADULT - NSHPREVIEWOFSYSTEMS_GEN_ALL_CORE
REVIEW OF SYSTEMS:    CONSTITUTIONAL: No weakness, fevers or chills  EYES/ENT: No visual changes;  No vertigo + throat pain   NECK: No pain or stiffness  RESPIRATORY: No cough, wheezing, hemoptysis; No shortness of breath  CARDIOVASCULAR: No chest pain or palpitations  GASTROINTESTINAL: No abdominal or epigastric pain. No nausea, vomiting, or hematemesis; No diarrhea or constipation. No melena or hematochezia.  GENITOURINARY: No dysuria, frequency or hematuria  NEUROLOGICAL: No numbness or weakness  SKIN: No itching, burning, rashes, or lesions   All other review of systems is negative unless indicated above.

## 2023-03-19 NOTE — H&P ADULT - HISTORY OF PRESENT ILLNESS
73 year old male with PMH of HLD presents with generalized weakness. Patient states that last night he started to have a sore throat, and felt weak. Denies any other sxs, and denies any fever, chills, nasal congestion, cough, chest pain, SOB, abdominal pain, back pain or change in bowel habits. Denies any recent travel or sick contacts.     In the ED:  Temp 102.9. , /66, 96 % on RA  WBC 19  RVP negative   s/p zosyn + 1L bolus  73 year old male with PMH of HLD presents with generalized weakness. Patient states that last night he started to have a sore throat, and felt weak. Denies any other sxs, and denies any fever, chills, nasal congestion, cough, chest pain, SOB, abdominal pain, back pain or change in bowel habits. Denies any weight loss, recent travel or sick contacts.     In the ED:  Temp 102.9. , /66, 96 % on RA  WBC 19  RVP negative   s/p zosyn + 1L bolus

## 2023-03-19 NOTE — H&P ADULT - VTE RISK ASSESSMENT
Bed: 18  Expected date: 2/18/23  Expected time:   Means of arrival:   Comments:  willy   VTE Assessment already completed for this visit

## 2023-03-19 NOTE — ED ADULT NURSE NOTE - ED STAT RN HANDOFF DETAILS
Report given to SOPHIA HAYES Pt resting in bed, no acute distress noted, denies chest pain, no SOB noted.

## 2023-03-19 NOTE — H&P ADULT - ATTENDING COMMENTS
sepsis  unknown source  f/u cult  id consult    Advanced care planning was discussed with patient and family.  Advanced care planning forms were reviewed and discussed as appropriate.  Differential diagnosis and plan of care discussed with patient after the evaluation.   Pain assessed and judicious use of narcotics when appropriate was discussed.  Importance of Fall prevention discussed.  Counseling on Smoking and Alcohol cessation was offered when appropriate.  Counseling on Diet, exercise, and medication compliance was done.       Approx 60 minutes spent.

## 2023-03-19 NOTE — H&P ADULT - PROBLEM SELECTOR PLAN 1
p/w sore throat and generalized weakness for one day  Temp 102.9. , /66, 96 % on RA  WBC 19  RVP negative   CXR clear, UA negative  s/p zosyn + 1L bolus   Concern for possible viral source  Will hold abx at this time  f/u blood cultures  c/w IVF  f/u RVP panel

## 2023-03-19 NOTE — ED PROVIDER NOTE - OBJECTIVE STATEMENT
Patient presenting complaining of generalized malaise, difficulty breathing and R lower back pain beginning last night into this morning.  Denying coughing.  No known asthma or pulmonary disease.  Former smoker quit >40 years ago.  Found to be febrile here, did not know about fever.Denying associated sick contacts.  Denying associated nasal congestion, sore throat, chest pains or leg swelling.  Denying associated abdominal pains nausea vomiting or diarrhea.  Denying associated urinary pain or blood in the urine.

## 2023-03-20 DIAGNOSIS — A49.1 STREPTOCOCCAL INFECTION, UNSPECIFIED SITE: ICD-10-CM

## 2023-03-20 DIAGNOSIS — C61 MALIGNANT NEOPLASM OF PROSTATE: ICD-10-CM

## 2023-03-20 DIAGNOSIS — Z02.9 ENCOUNTER FOR ADMINISTRATIVE EXAMINATIONS, UNSPECIFIED: ICD-10-CM

## 2023-03-20 LAB
ALBUMIN SERPL ELPH-MCNC: 3.4 G/DL — LOW (ref 3.5–5)
ALP SERPL-CCNC: 78 U/L — SIGNIFICANT CHANGE UP (ref 40–120)
ALT FLD-CCNC: 39 U/L DA — SIGNIFICANT CHANGE UP (ref 10–60)
ANION GAP SERPL CALC-SCNC: 6 MMOL/L — SIGNIFICANT CHANGE UP (ref 5–17)
AST SERPL-CCNC: 18 U/L — SIGNIFICANT CHANGE UP (ref 10–40)
BASOPHILS # BLD AUTO: 0.07 K/UL — SIGNIFICANT CHANGE UP (ref 0–0.2)
BASOPHILS NFR BLD AUTO: 0.3 % — SIGNIFICANT CHANGE UP (ref 0–2)
BILIRUB SERPL-MCNC: 0.9 MG/DL — SIGNIFICANT CHANGE UP (ref 0.2–1.2)
BUN SERPL-MCNC: 16 MG/DL — SIGNIFICANT CHANGE UP (ref 7–18)
CALCIUM SERPL-MCNC: 9.1 MG/DL — SIGNIFICANT CHANGE UP (ref 8.4–10.5)
CHLORIDE SERPL-SCNC: 106 MMOL/L — SIGNIFICANT CHANGE UP (ref 96–108)
CO2 SERPL-SCNC: 26 MMOL/L — SIGNIFICANT CHANGE UP (ref 22–31)
CREAT SERPL-MCNC: 0.95 MG/DL — SIGNIFICANT CHANGE UP (ref 0.5–1.3)
CULTURE RESULTS: SIGNIFICANT CHANGE UP
EGFR: 86 ML/MIN/1.73M2 — SIGNIFICANT CHANGE UP
EOSINOPHIL # BLD AUTO: 0.03 K/UL — SIGNIFICANT CHANGE UP (ref 0–0.5)
EOSINOPHIL NFR BLD AUTO: 0.1 % — SIGNIFICANT CHANGE UP (ref 0–6)
GLUCOSE SERPL-MCNC: 112 MG/DL — HIGH (ref 70–99)
HCT VFR BLD CALC: 43.3 % — SIGNIFICANT CHANGE UP (ref 39–50)
HCV AB S/CO SERPL IA: 0.15 S/CO — SIGNIFICANT CHANGE UP (ref 0–0.99)
HCV AB SERPL-IMP: SIGNIFICANT CHANGE UP
HGB BLD-MCNC: 14.3 G/DL — SIGNIFICANT CHANGE UP (ref 13–17)
IMM GRANULOCYTES NFR BLD AUTO: 0.5 % — SIGNIFICANT CHANGE UP (ref 0–0.9)
LYMPHOCYTES # BLD AUTO: 1.84 K/UL — SIGNIFICANT CHANGE UP (ref 1–3.3)
LYMPHOCYTES # BLD AUTO: 9.1 % — LOW (ref 13–44)
MAGNESIUM SERPL-MCNC: 2.5 MG/DL — SIGNIFICANT CHANGE UP (ref 1.6–2.6)
MCHC RBC-ENTMCNC: 30.6 PG — SIGNIFICANT CHANGE UP (ref 27–34)
MCHC RBC-ENTMCNC: 33 GM/DL — SIGNIFICANT CHANGE UP (ref 32–36)
MCV RBC AUTO: 92.5 FL — SIGNIFICANT CHANGE UP (ref 80–100)
MONOCYTES # BLD AUTO: 1.14 K/UL — HIGH (ref 0–0.9)
MONOCYTES NFR BLD AUTO: 5.7 % — SIGNIFICANT CHANGE UP (ref 2–14)
NEUTROPHILS # BLD AUTO: 16.94 K/UL — HIGH (ref 1.8–7.4)
NEUTROPHILS NFR BLD AUTO: 84.3 % — HIGH (ref 43–77)
NRBC # BLD: 0 /100 WBCS — SIGNIFICANT CHANGE UP (ref 0–0)
PHOSPHATE SERPL-MCNC: 2.2 MG/DL — LOW (ref 2.5–4.5)
PLATELET # BLD AUTO: 132 K/UL — LOW (ref 150–400)
POTASSIUM SERPL-MCNC: 3.7 MMOL/L — SIGNIFICANT CHANGE UP (ref 3.5–5.3)
POTASSIUM SERPL-SCNC: 3.7 MMOL/L — SIGNIFICANT CHANGE UP (ref 3.5–5.3)
PROT SERPL-MCNC: 7.2 G/DL — SIGNIFICANT CHANGE UP (ref 6–8.3)
RBC # BLD: 4.68 M/UL — SIGNIFICANT CHANGE UP (ref 4.2–5.8)
RBC # FLD: 13.4 % — SIGNIFICANT CHANGE UP (ref 10.3–14.5)
S PYO DNA THROAT QL NAA+PROBE: ABNORMAL
SODIUM SERPL-SCNC: 138 MMOL/L — SIGNIFICANT CHANGE UP (ref 135–145)
SPECIMEN SOURCE: SIGNIFICANT CHANGE UP
WBC # BLD: 20.13 K/UL — HIGH (ref 3.8–10.5)
WBC # FLD AUTO: 20.13 K/UL — HIGH (ref 3.8–10.5)

## 2023-03-20 PROCEDURE — 72131 CT LUMBAR SPINE W/O DYE: CPT | Mod: 26

## 2023-03-20 RX ORDER — ACETAMINOPHEN 500 MG
1000 TABLET ORAL ONCE
Refills: 0 | Status: COMPLETED | OUTPATIENT
Start: 2023-03-20 | End: 2023-03-20

## 2023-03-20 RX ORDER — PIPERACILLIN AND TAZOBACTAM 4; .5 G/20ML; G/20ML
3.38 INJECTION, POWDER, LYOPHILIZED, FOR SOLUTION INTRAVENOUS EVERY 8 HOURS
Refills: 0 | Status: DISCONTINUED | OUTPATIENT
Start: 2023-03-20 | End: 2023-03-20

## 2023-03-20 RX ORDER — AMPICILLIN SODIUM AND SULBACTAM SODIUM 250; 125 MG/ML; MG/ML
3 INJECTION, POWDER, FOR SUSPENSION INTRAMUSCULAR; INTRAVENOUS EVERY 6 HOURS
Refills: 0 | Status: DISCONTINUED | OUTPATIENT
Start: 2023-03-20 | End: 2023-03-22

## 2023-03-20 RX ADMIN — PIPERACILLIN AND TAZOBACTAM 25 GRAM(S): 4; .5 INJECTION, POWDER, LYOPHILIZED, FOR SOLUTION INTRAVENOUS at 21:35

## 2023-03-20 RX ADMIN — PIPERACILLIN AND TAZOBACTAM 25 GRAM(S): 4; .5 INJECTION, POWDER, LYOPHILIZED, FOR SOLUTION INTRAVENOUS at 14:10

## 2023-03-20 RX ADMIN — Medication 400 MILLIGRAM(S): at 04:26

## 2023-03-20 RX ADMIN — Medication 81 MILLIGRAM(S): at 11:38

## 2023-03-20 RX ADMIN — AMPICILLIN SODIUM AND SULBACTAM SODIUM 200 GRAM(S): 250; 125 INJECTION, POWDER, FOR SUSPENSION INTRAMUSCULAR; INTRAVENOUS at 23:29

## 2023-03-20 RX ADMIN — ATORVASTATIN CALCIUM 40 MILLIGRAM(S): 80 TABLET, FILM COATED ORAL at 21:35

## 2023-03-20 RX ADMIN — Medication 1000 MILLIGRAM(S): at 05:00

## 2023-03-20 NOTE — CONSULT NOTE ADULT - SUBJECTIVE AND OBJECTIVE BOX
HPI:  73 year old male with PMH of HLD presents with generalized weakness. Patient states that last night he started to have a sore throat, and felt weak. Denies any other sxs, and denies any fever, chills, nasal congestion, cough, chest pain, SOB, abdominal pain, back pain or change in bowel habits. Denies any weight loss, recent travel or sick contacts.     In the ED:  Temp 102.9. , /66, 96 % on RA  WBC 19  RVP negative   s/p zosyn + 1L bolus  (19 Mar 2023 19:14)      PAST MEDICAL & SURGICAL HISTORY:  BPH (benign prostatic hyperplasia)      HLD (hyperlipidemia)      History of appendectomy      H/O: varicose veins      H/O prostatectomy          No Known Allergies      Meds:  aspirin enteric coated 81 milliGRAM(s) Oral daily  atorvastatin 40 milliGRAM(s) Oral at bedtime  piperacillin/tazobactam IVPB.. 3.375 Gram(s) IV Intermittent every 8 hours      SOCIAL HISTORY:  Smoker:  YES / NO        PACK YEARS:                         WHEN QUIT?  ETOH use:  YES / NO               FREQUENCY / QUANTITY:  Ilicit Drug use:  YES / NO  Occupation:  Assisted device use (Cane / Walker):  Live with:    FAMILY HISTORY:      VITALS:  Vital Signs Last 24 Hrs  T(C): 36.7 (20 Mar 2023 12:50), Max: 39 (19 Mar 2023 22:24)  T(F): 98.1 (20 Mar 2023 12:50), Max: 102.2 (19 Mar 2023 22:24)  HR: 106 (20 Mar 2023 12:50) (99 - 116)  BP: 135/71 (20 Mar 2023 12:50) (120/77 - 135/71)  BP(mean): 84 (20 Mar 2023 12:50) (84 - 84)  RR: 16 (20 Mar 2023 12:50) (16 - 18)  SpO2: 97% (20 Mar 2023 12:50) (94% - 97%)    Parameters below as of 20 Mar 2023 12:50  Patient On (Oxygen Delivery Method): room air        LABS/DIAGNOSTIC TESTS:                          14.3   20.13 )-----------( 132      ( 20 Mar 2023 06:55 )             43.3     WBC Count: 20.13 K/uL (03-20 @ 06:55)  WBC Count: 19.25 K/uL ( @ 11:05)          138  |  106  |  16  ----------------------------<  112<H>  3.7   |  26  |  0.95    Ca    9.1      20 Mar 2023 06:55  Phos  2.2       Mg     2.5         TPro  7.2  /  Alb  3.4<L>  /  TBili  0.9  /  DBili  x   /  AST  18  /  ALT  39  /  AlkPhos  78        Urinalysis Basic - ( 19 Mar 2023 11:05 )    Color: Yellow / Appearance: Clear / S.010 / pH: x  Gluc: x / Ketone: Negative  / Bili: Negative / Urobili: Negative   Blood: x / Protein: 30 mg/dL / Nitrite: Negative   Leuk Esterase: Negative / RBC: 2-5 /HPF / WBC 0-2 /HPF   Sq Epi: x / Non Sq Epi: Occasional /HPF / Bacteria: Trace /HPF        LIVER FUNCTIONS - ( 20 Mar 2023 06:55 )  Alb: 3.4 g/dL / Pro: 7.2 g/dL / ALK PHOS: 78 U/L / ALT: 39 U/L DA / AST: 18 U/L / GGT: x             PT/INR - ( 19 Mar 2023 11:05 )   PT: 14.1 sec;   INR: 1.18 ratio         PTT - ( 19 Mar 2023 11:05 )  PTT:28.6 sec    LACTATE:    ABG -     CULTURES:   Clean Catch Clean Catch (Midstream)   @ 11:05   <10,000 CFU/mL Normal Urogenital Geovanna  --  --          RADIOLOGY:< from: Xray Chest 1 View- PORTABLE-Urgent (23 @ 14:21) >  ACC: [id number] EXAM:  XR CHEST PORTABLE URGENT 1V   ORDERED BY: DREW HUGGINS     PROCEDURE DATE:  2023          INTERPRETATION:  AP chest on 2023 at 1:55 PM. Patient has   sepsis.    Heart magnified by technique.    Lungs remainclear.    Chest is similar to 2021.    IMPRESSION: No acute finding or change.    --- End of Report ---            ALLY SALTER MD; Attending Radiologist  This document has been electronically signed. Mar 20 2023  1:42PM    < end of copied text >        ROS  [  ] UNABLE TO ELICIT               HPI:  73 year old male with PMH of HLD presents with generalized weakness. Patient states that last night he started to have a sore throat, and felt weak. Denies any other sxs, and denies any fever, chills, nasal congestion, cough, chest pain, SOB, abdominal pain, back pain or change in bowel habits. Denies any weight loss, recent travel or sick contacts.     In the ED:  Temp 102.9. , /66, 96 % on RA  WBC 19  RVP negative   s/p zosyn + 1L bolus  (19 Mar 2023 19:14)        History as above, asked to see this patient who presented to the hospital with a 2 day history of weakness , sore throat and fevers with chills, his daughter is at the bedside and is translating for me. He denies any coughing , SOB, chest pain, no nausea, vomiting , diarrhea, abdominal pain , no urinary symptoms.          PAST MEDICAL & SURGICAL HISTORY:  BPH (benign prostatic hyperplasia)      HLD (hyperlipidemia)      History of appendectomy      H/O: varicose veins      H/O prostatectomy          No Known Allergies      Meds:  aspirin enteric coated 81 milliGRAM(s) Oral daily  atorvastatin 40 milliGRAM(s) Oral at bedtime  piperacillin/tazobactam IVPB.. 3.375 Gram(s) IV Intermittent every 8 hours      SOCIAL HISTORY:  Smoker:  no  ETOH use:  no      FAMILY HISTORY: not contributory      VITALS:  Vital Signs Last 24 Hrs  T(C): 36.7 (20 Mar 2023 12:50), Max: 39 (19 Mar 2023 22:24)  T(F): 98.1 (20 Mar 2023 12:50), Max: 102.2 (19 Mar 2023 22:24)  HR: 106 (20 Mar 2023 12:50) (99 - 116)  BP: 135/71 (20 Mar 2023 12:50) (120/77 - 135/71)  BP(mean): 84 (20 Mar 2023 12:50) (84 - 84)  RR: 16 (20 Mar 2023 12:50) (16 - 18)  SpO2: 97% (20 Mar 2023 12:50) (94% - 97%)    Parameters below as of 20 Mar 2023 12:50  Patient On (Oxygen Delivery Method): room air        LABS/DIAGNOSTIC TESTS:                          14.3   20.13 )-----------( 132      ( 20 Mar 2023 06:55 )             43.3     WBC Count: 20.13 K/uL ( @ 06:55)  WBC Count: 19.25 K/uL ( @ 11:05)          138  |  106  |  16  ----------------------------<  112<H>  3.7   |  26  |  0.95    Ca    9.1      20 Mar 2023 06:55  Phos  2.2       Mg     2.5         TPro  7.2  /  Alb  3.4<L>  /  TBili  0.9  /  DBili  x   /  AST  18  /  ALT  39  /  AlkPhos  78        Urinalysis Basic - ( 19 Mar 2023 11:05 )    Color: Yellow / Appearance: Clear / S.010 / pH: x  Gluc: x / Ketone: Negative  / Bili: Negative / Urobili: Negative   Blood: x / Protein: 30 mg/dL / Nitrite: Negative   Leuk Esterase: Negative / RBC: 2-5 /HPF / WBC 0-2 /HPF   Sq Epi: x / Non Sq Epi: Occasional /HPF / Bacteria: Trace /HPF        LIVER FUNCTIONS - ( 20 Mar 2023 06:55 )  Alb: 3.4 g/dL / Pro: 7.2 g/dL / ALK PHOS: 78 U/L / ALT: 39 U/L DA / AST: 18 U/L / GGT: x             PT/INR - ( 19 Mar 2023 11:05 )   PT: 14.1 sec;   INR: 1.18 ratio         PTT - ( 19 Mar 2023 11:05 )  PTT:28.6 sec    LACTATE:    ABG -     CULTURES:   Clean Catch Clean Catch (Midstream)   @ 11:05   <10,000 CFU/mL Normal Urogenital Geovanna  --  --          RADIOLOGY:< from: Xray Chest 1 View- PORTABLE-Urgent (23 @ 14:21) >  ACC: [id number] EXAM:  XR CHEST PORTABLE URGENT 1V   ORDERED BY: DREW HUGGINS     PROCEDURE DATE:  2023          INTERPRETATION:  AP chest on 2023 at 1:55 PM. Patient has   sepsis.    Heart magnified by technique.    Lungs remainclear.    Chest is similar to 2021.    IMPRESSION: No acute finding or change.    --- End of Report ---            ALLY SALTER MD; Attending Radiologist  This document has been electronically signed. Mar 20 2023  1:42PM    < end of copied text >        ROS  [  ] UNABLE TO ELICIT

## 2023-03-20 NOTE — CONSULT NOTE ADULT - ASSESSMENT
Opt out
Tonsillitis (Bilat)  Fevers  Leukocytosis      Plan - DC Zosyn  Start Unasyn 3 gms iv q6 hrs  await cultures  once afebrile will plan to DC home on Amoxicillin po.

## 2023-03-20 NOTE — PATIENT PROFILE ADULT - FALL HARM RISK - CONCLUSION
Universal Safety Interventions Has The Growth Been Previously Biopsied?: has been previously biopsied

## 2023-03-20 NOTE — PROGRESS NOTE ADULT - SUBJECTIVE AND OBJECTIVE BOX
NP Note discussed with  primary attending    Patient is a 73y old  Male who presents with a chief complaint of fever of unknown origin    INTERVAL HPI/OVERNIGHT EVENTS: low back pain, sore throat     MEDICATIONS  (STANDING):  aspirin enteric coated 81 milliGRAM(s) Oral daily  atorvastatin 40 milliGRAM(s) Oral at bedtime  piperacillin/tazobactam IVPB.. 3.375 Gram(s) IV Intermittent every 8 hours    MEDICATIONS  (PRN):      __________________________________________________  REVIEW OF SYSTEMS:    CONSTITUTIONAL: No fever,   EYES: no acute visual disturbances  NECK: No pain or stiffness  RESPIRATORY: No cough; No shortness of breath  CARDIOVASCULAR: No chest pain, no palpitations  GASTROINTESTINAL: No pain. No nausea or vomiting; No diarrhea   NEUROLOGICAL: No headache or numbness, no tremors  MUSCULOSKELETAL: + low back pain   GENITOURINARY: no dysuria, no frequency, no hesitancy  PSYCHIATRY: no depression , no anxiety  ALL OTHER  ROS negative        Vital Signs Last 24 Hrs  T(C): 36.7 (20 Mar 2023 12:50), Max: 39 (19 Mar 2023 22:24)  T(F): 98.1 (20 Mar 2023 12:50), Max: 102.2 (19 Mar 2023 22:24)  HR: 106 (20 Mar 2023 12:50) (99 - 116)  BP: 135/71 (20 Mar 2023 12:50) (120/77 - 135/71)  BP(mean): 84 (20 Mar 2023 12:50) (84 - 84)  RR: 16 (20 Mar 2023 12:50) (16 - 18)  SpO2: 97% (20 Mar 2023 12:50) (94% - 97%)    Parameters below as of 20 Mar 2023 12:50  Patient On (Oxygen Delivery Method): room air        ________________________________________________  PHYSICAL EXAM:  GENERAL: NAD  HEENT: Normocephalic;  conjunctivae and sclerae clear;   NECK : supple  CHEST/LUNG: Clear to auscultation bilaterally with good air entry   HEART: S1 S2  regular; no murmurs, gallops or rubs  ABDOMEN: Soft, Nontender, Nondistended; Bowel sounds present  EXTREMITIES: no cyanosis; no edema; no calf tenderness  SKIN: warm and dry; no rash  NERVOUS SYSTEM:  Awake and alert; Oriented  to place, person and time   _________________________________________________  LABS:                        14.3   20.13 )-----------( 132      ( 20 Mar 2023 06:55 )             43.3     03-20    138  |  106  |  16  ----------------------------<  112<H>  3.7   |  26  |  0.95    Ca    9.1      20 Mar 2023 06:55  Phos  2.2     -20  Mg     2.5     -20    TPro  7.2  /  Alb  3.4<L>  /  TBili  0.9  /  DBili  x   /  AST  18  /  ALT  39  /  AlkPhos  78  03-20    PT/INR - ( 19 Mar 2023 11:05 )   PT: 14.1 sec;   INR: 1.18 ratio         PTT - ( 19 Mar 2023 11:05 )  PTT:28.6 sec  Urinalysis Basic - ( 19 Mar 2023 11:05 )    Color: Yellow / Appearance: Clear / S.010 / pH: x  Gluc: x / Ketone: Negative  / Bili: Negative / Urobili: Negative   Blood: x / Protein: 30 mg/dL / Nitrite: Negative   Leuk Esterase: Negative / RBC: 2-5 /HPF / WBC 0-2 /HPF   Sq Epi: x / Non Sq Epi: Occasional /HPF / Bacteria: Trace /HPF      CAPILLARY BLOOD GLUCOSE    RADIOLOGY & ADDITIONAL TESTS:   < from: Xray Chest 1 View- PORTABLE-Urgent (23 @ 14:21) >  IMPRESSION: No acute finding or change.    --- End of Report ---    < end of copied text >    Imaging Personally Reviewed:  YES    Consultant(s) Notes Reviewed:   YES      Plan of care was discussed with patient and /or primary care giver; all questions and concerns were addressed and care was aligned with patient's wishes.

## 2023-03-20 NOTE — PATIENT PROFILE ADULT - FUNCTIONAL ASSESSMENT - DAILY ACTIVITY SCORE.
"SUBJECTIVE:   Nayely Kay is a 84 year old female who presents for Preventive Visit.  Chief Complaint   Patient presents with     Physical     Hypertension.  On multiple meds.  Not checking at home.    mONITORING inr.      Patient has been advised of split billing requirements and indicates understanding: Yes   Are you in the first 12 months of your Medicare coverage?  No    Healthy Habits:     In general, how would you rate your overall health?  Good    Frequency of exercise:  2-3 days/week    Duration of exercise:  15-30 minutes    Do you usually eat at least 4 servings of fruit and vegetables a day, include whole grains    & fiber and avoid regularly eating high fat or \"junk\" foods?  Yes    Taking medications regularly:  Yes    Medication side effects:  None    Ability to successfully perform activities of daily living:  No assistance needed    Home Safety:  No safety concerns identified    Hearing Impairment:  No hearing concerns    In the past 6 months, have you been bothered by leaking of urine?  No    In general, how would you rate your overall mental or emotional health?  Good      PHQ-2 Total Score: 0    Additional concerns today:  No  Exercise:walks.  Still working cleaning houses.   Bad hearing in one ear since age 16-injury.     Do you feel safe in your environment? Yes    Have you ever done Advance Care Planning? (For example, a Health Directive, POLST, or a discussion with a medical provider or your loved ones about your wishes): Yes, advance care planning is on file.    Fall risk  Fallen 2 or more times in the past year?: No  Any fall with injury in the past year?: No    Cognitive Screening   1) Repeat 3 items (Leader, Season, Table)    2) Clock draw: NORMAL  3) 3 item recall: Recalls 2 objects   Results: NORMAL clock, 1-2 items recalled: COGNITIVE IMPAIRMENT LESS LIKELY    Mini-CogTM Copyright CRYSTAL Traylor. Licensed by the author for use in [facility]; reprinted with permission " (erika@[facility]). All rights reserved.      Do you have sleep apnea, excessive snoring or daytime drowsiness?: no     Social History     Tobacco Use     Smoking status: Never Smoker     Smokeless tobacco: Never Used   Substance Use Topics     Alcohol use: No     If you drink alcohol do you typically have >3 drinks per day or >7 drinks per week? No    Alcohol Use 1/3/2022   Prescreen: >3 drinks/day or >7 drinks/week? Not Applicable   Prescreen: >3 drinks/day or >7 drinks/week? -   No flowsheet data found.      Current providers sharing in care for this patient include:   Patient Care Team:  Oswaldo Cedillo MD as PCP - General (Family Practice)  Oswaldo Cedillo MD as Assigned PCP  No specialists.   The following health maintenance items are reviewed in Epic and correct as of today:  Health Maintenance Due   Topic Date Due     ANNUAL REVIEW OF HM ORDERS  Never done     ZOSTER IMMUNIZATION (1 of 2) Never done     FALL RISK ASSESSMENT  11/08/2018       Pertinent mammograms are reviewed under the imaging tab.    Patient Active Problem List    Diagnosis Date Noted     Long term current use of anticoagulants with INR goal of 2.0-3.0 10/26/2021     Priority: Medium     Elevated bilirubin 07/15/2019     Priority: Medium     7/15/2019:Possible Gilbert's.  Persistent elevated bili-uncongugated.        Tubular adenoma 07/18/2017     Priority: Medium     7/13/2017 colonoscopy with two small polyps 2 and 5mm.  One a tubular adenoma.  PLAN: consider follow-up in 5 years.        Essential hypertension 05/04/2017     Priority: Medium     Long term current use of anticoagulant therapy 03/09/2015     Priority: Medium     Problem list name updated by automated process. Provider to review       Family history of breast cancer 10/03/2011     Priority: Medium     We discussed this; there are two cousins and two aunts with breast cancer. She will do the mammograms regularly until age 82.        Osteopenia 04/08/2011     Priority: Medium      DEXA April, 2011; right hip has T score of -1.3.        HYPERLIPIDEMIA LDL GOAL <130 10/31/2010     Priority: Medium     Polymyalgia rheumatica (H) 06/04/2008     Priority: Medium     Iron deficiency anemia 06/04/2008     Priority: Medium     Headache 09/07/2005     Priority: Medium     Migraine.  3/14/2018:Has not been a recent problem.         Chronic atrial fibrillation (H) 09/07/2005     Priority: Medium     Ganglion cyst 10/11/2013     Priority: Low     Advanced directives, counseling/discussion 10/05/2012     Priority: Low     Patient states has Advance Directive and will bring in a copy to clinic. 10/5/2012          AK (actinic keratosis) 10/05/2012     Priority: Low     Eczema 04/05/2012     Priority: Low       History   Smoking Status     Never Smoker   Smokeless Tobacco     Never Used     Multivitamin or Vit D use: see epic    Vaccines:current tetanus.  Has had COVID-19 virus     Lab Results   Component Value Date    PAP NIL 09/22/2008     Past colon cancer screen:2017-polyps  Past mammogram:11/2020     Review of Systems   Constitutional: Negative for chills and fever.   HENT: Negative for congestion, ear pain, hearing loss and sore throat.    Eyes: Negative for pain and visual disturbance.   Respiratory: Negative for cough and shortness of breath.    Cardiovascular: Negative for chest pain, palpitations and peripheral edema.   Gastrointestinal: Negative for abdominal pain, constipation, diarrhea, heartburn, hematochezia and nausea.   Breasts:  Negative for tenderness, breast mass and discharge.   Genitourinary: Negative for dysuria, frequency, genital sores, hematuria, pelvic pain, urgency, vaginal bleeding and vaginal discharge.   Musculoskeletal: Negative for arthralgias, joint swelling and myalgias.   Skin: Negative for rash.   Neurological: Negative for dizziness, weakness, headaches and paresthesias.   Psychiatric/Behavioral: Negative for mood changes. The patient is not nervous/anxious.   "    OBJECTIVE:                                                    OBJECTIVE:Blood pressure 130/62, pulse 70, temperature 97.2  F (36.2  C), temperature source Tympanic, resp. rate 20, height 1.486 m (4' 10.5\"), weight 52.6 kg (116 lb), not currently breastfeeding. BMI=Body mass index is 23.83 kg/m .  GENERAL APPEARANCE ADULT: Alert, no acute distress  EYES: PERRL, EOM normal, conjunctiva and lids normal  HENT: Ears and TMs normal, oral mucosa and posterior oropharynx normal.  Partial plates upper and lower.   NECK: No adenopathy,masses or thyromegaly  RESP: lungs clear to auscultation   CV: irregular rhythm-irregularly irregular, rate-normal, no murmur  ABDOMEN: soft, no organomegaly, masses or tenderness  MS: extremities normal, no peripheral edema  Multiple finger deformities.  Osteoarthritis.      ASSESSMENT/PLAN:                                                    Lifestyle recommendations:  The following exams/tests were recommended and discussed for health maintenance:  When to stop colon cancer screening?  Experts agree that colon cancer screening is generally not recommended when life expectancy is <10 years and not at age over 85.  The Expert group USPSTF recommends against screening for ages 76 and older.  It is reasonable to stop screening for colon cancer sometime between age 76 and 85 for most individuals.   Mammogram screening recommendations differ among expert groups.  They may be started at age 40 or 50.  Screening can be yearly or every other year depending upon a person's risk and preference.   When to stop breast cancer screening/mammograms?  Many expert groups recommend screening only up to age 74.  Some experts advocate screening up until estimated 5-10 years anticipated remaining lifespan.     (Z00.00) Encounter for subsequent annual wellness visit in Medicare patient  (primary encounter diagnosis)    (I48.20) Chronic atrial fibrillation (H)  Comment: stable.  On chronic warfarin.   Plan: No " "change in current treatment plan.      (I10) Essential hypertension with goal blood pressure less than 140/90  Comment: doing well on medication  Plan: Basic metabolic panel  (Ca, Cl, CO2, Creat,         Gluc, K, Na, BUN)        No change in current treatment plan.  REfills as needed  Chem 8 blood test.       If you would like to do another colonoscopy, let me know.  You are due for one this year if you choose to continue.        Patient has been advised of split billing requirements and indicates understanding: Yes  COUNSELING:  Reviewed preventive health counseling, as reflected in patient instructions  Special attention given to:       Colon cancer screening    Estimated body mass index is 23.83 kg/m  as calculated from the following:    Height as of this encounter: 1.486 m (4' 10.5\").    Weight as of this encounter: 52.6 kg (116 lb).        She reports that she has never smoked. She has never used smokeless tobacco.      Appropriate preventive services were discussed with this patient, including applicable screening as appropriate for cardiovascular disease, diabetes, osteopenia/osteoporosis, and glaucoma.  As appropriate for age/gender, discussed screening for colorectal cancer, prostate cancer, breast cancer, and cervical cancer. Checklist reviewing preventive services available has been given to the patient.    Reviewed patients plan of care and provided an AVS. The Basic Care Plan (routine screening as documented in Health Maintenance) for Nayely meets the Care Plan requirement. This Care Plan has been established and reviewed with the Patient.    Counseling Resources:  ATP IV Guidelines  Pooled Cohorts Equation Calculator  Breast Cancer Risk Calculator  Breast Cancer: Medication to Reduce Risk  FRAX Risk Assessment  ICSI Preventive Guidelines  Dietary Guidelines for Americans, 2010  USDA's MyPlate  ASA Prophylaxis  Lung CA Screening    Oswaldo Cedillo MD  [facility]    Identified " Health Risks:   24

## 2023-03-20 NOTE — PROGRESS NOTE ADULT - PROBLEM SELECTOR PLAN 2
septic on admission   WBC 20  febrile overnight   continue Zosyn  CXR clear, UA negative  f/u blood cultures  ID Quinn santos

## 2023-03-21 ENCOUNTER — NON-APPOINTMENT (OUTPATIENT)
Age: 73
End: 2023-03-21

## 2023-03-21 DIAGNOSIS — B00.9 HERPESVIRAL INFECTION, UNSPECIFIED: ICD-10-CM

## 2023-03-21 LAB
ANION GAP SERPL CALC-SCNC: 3 MMOL/L — LOW (ref 5–17)
BUN SERPL-MCNC: 17 MG/DL — SIGNIFICANT CHANGE UP (ref 7–18)
CALCIUM SERPL-MCNC: 9.3 MG/DL — SIGNIFICANT CHANGE UP (ref 8.4–10.5)
CHLORIDE SERPL-SCNC: 107 MMOL/L — SIGNIFICANT CHANGE UP (ref 96–108)
CO2 SERPL-SCNC: 27 MMOL/L — SIGNIFICANT CHANGE UP (ref 22–31)
CREAT SERPL-MCNC: 0.98 MG/DL — SIGNIFICANT CHANGE UP (ref 0.5–1.3)
EGFR: 82 ML/MIN/1.73M2 — SIGNIFICANT CHANGE UP
GLUCOSE SERPL-MCNC: 121 MG/DL — HIGH (ref 70–99)
HCT VFR BLD CALC: 43.8 % — SIGNIFICANT CHANGE UP (ref 39–50)
HGB BLD-MCNC: 14.5 G/DL — SIGNIFICANT CHANGE UP (ref 13–17)
MCHC RBC-ENTMCNC: 29.9 PG — SIGNIFICANT CHANGE UP (ref 27–34)
MCHC RBC-ENTMCNC: 33.1 GM/DL — SIGNIFICANT CHANGE UP (ref 32–36)
MCV RBC AUTO: 90.3 FL — SIGNIFICANT CHANGE UP (ref 80–100)
NRBC # BLD: 0 /100 WBCS — SIGNIFICANT CHANGE UP (ref 0–0)
PLATELET # BLD AUTO: 141 K/UL — LOW (ref 150–400)
POTASSIUM SERPL-MCNC: 4.1 MMOL/L — SIGNIFICANT CHANGE UP (ref 3.5–5.3)
POTASSIUM SERPL-SCNC: 4.1 MMOL/L — SIGNIFICANT CHANGE UP (ref 3.5–5.3)
RBC # BLD: 4.85 M/UL — SIGNIFICANT CHANGE UP (ref 4.2–5.8)
RBC # FLD: 13.4 % — SIGNIFICANT CHANGE UP (ref 10.3–14.5)
SODIUM SERPL-SCNC: 137 MMOL/L — SIGNIFICANT CHANGE UP (ref 135–145)
WBC # BLD: 10.19 K/UL — SIGNIFICANT CHANGE UP (ref 3.8–10.5)
WBC # FLD AUTO: 10.19 K/UL — SIGNIFICANT CHANGE UP (ref 3.8–10.5)

## 2023-03-21 RX ORDER — VALACYCLOVIR 500 MG/1
500 TABLET, FILM COATED ORAL
Refills: 0 | Status: DISCONTINUED | OUTPATIENT
Start: 2023-03-21 | End: 2023-03-22

## 2023-03-21 RX ORDER — ACETAMINOPHEN 500 MG
650 TABLET ORAL EVERY 6 HOURS
Refills: 0 | Status: DISCONTINUED | OUTPATIENT
Start: 2023-03-21 | End: 2023-03-22

## 2023-03-21 RX ADMIN — AMPICILLIN SODIUM AND SULBACTAM SODIUM 200 GRAM(S): 250; 125 INJECTION, POWDER, FOR SUSPENSION INTRAMUSCULAR; INTRAVENOUS at 23:34

## 2023-03-21 RX ADMIN — ATORVASTATIN CALCIUM 40 MILLIGRAM(S): 80 TABLET, FILM COATED ORAL at 21:43

## 2023-03-21 RX ADMIN — AMPICILLIN SODIUM AND SULBACTAM SODIUM 200 GRAM(S): 250; 125 INJECTION, POWDER, FOR SUSPENSION INTRAMUSCULAR; INTRAVENOUS at 17:24

## 2023-03-21 RX ADMIN — AMPICILLIN SODIUM AND SULBACTAM SODIUM 200 GRAM(S): 250; 125 INJECTION, POWDER, FOR SUSPENSION INTRAMUSCULAR; INTRAVENOUS at 12:23

## 2023-03-21 RX ADMIN — Medication 81 MILLIGRAM(S): at 12:23

## 2023-03-21 RX ADMIN — VALACYCLOVIR 500 MILLIGRAM(S): 500 TABLET, FILM COATED ORAL at 16:04

## 2023-03-21 RX ADMIN — AMPICILLIN SODIUM AND SULBACTAM SODIUM 200 GRAM(S): 250; 125 INJECTION, POWDER, FOR SUSPENSION INTRAMUSCULAR; INTRAVENOUS at 05:26

## 2023-03-21 NOTE — PROGRESS NOTE ADULT - PROBLEM SELECTOR PLAN 6
patient from home   febrile overnight   pending final blood cultures   remains on IV Unasyn   will need to be afebrile for 24 hours prior to D/C IMPROVE VTE Individual Risk Assessment          RISK                                                          Points  [  ] Previous VTE                                                3  [  ] Thrombophilia                                             2  [  ] Lower limb paralysis                                   2        (unable to hold up >15 seconds)    [  ] Current Cancer                                             2         (within 6 months)  [ x ] Immobilization > 24 hrs                              1  [  ] ICU/CCU stay > 24 hours                             1  [ x ] Age > 60                                                         1    IMPROVE VTE Score:         [     2    ]      Lovenox 40mg

## 2023-03-21 NOTE — PROGRESS NOTE ADULT - SUBJECTIVE AND OBJECTIVE BOX
NP Note discussed with  primary attending    Patient is a 73y old  Male who presents with a chief complaint of fever of unknown origin (20 Mar 2023 17:16)      INTERVAL HPI/OVERNIGHT EVENTS: + febrile overnight     MEDICATIONS  (STANDING):  ampicillin/sulbactam  IVPB 3 Gram(s) IV Intermittent every 6 hours  aspirin enteric coated 81 milliGRAM(s) Oral daily  atorvastatin 40 milliGRAM(s) Oral at bedtime    MEDICATIONS  (PRN):      __________________________________________________  REVIEW OF SYSTEMS:    CONSTITUTIONAL: +fever,   EYES: no acute visual disturbances  NECK: No pain or stiffness  RESPIRATORY: No cough; No shortness of breath  CARDIOVASCULAR: No chest pain, no palpitations  GASTROINTESTINAL: No pain. No nausea or vomiting; No diarrhea   NEUROLOGICAL: No headache or numbness, no tremors  MUSCULOSKELETAL: No joint pain, no muscle pain  GENITOURINARY: no dysuria, no frequency, no hesitancy  PSYCHIATRY: no depression , no anxiety  ALL OTHER  ROS negative        Vital Signs Last 24 Hrs  T(C): 36.9 (21 Mar 2023 05:44), Max: 37.1 (20 Mar 2023 20:47)  T(F): 98.5 (21 Mar 2023 05:44), Max: 98.8 (20 Mar 2023 20:47)  HR: 84 (21 Mar 2023 05:44) (84 - 106)  BP: 114/75 (21 Mar 2023 05:44) (114/75 - 135/71)  BP(mean): 77 (20 Mar 2023 20:47) (77 - 84)  RR: 18 (21 Mar 2023 05:44) (16 - 18)  SpO2: 95% (21 Mar 2023 05:44) (95% - 97%)    Parameters below as of 21 Mar 2023 05:44  Patient On (Oxygen Delivery Method): room air        ________________________________________________  PHYSICAL EXAM:  GENERAL: NAD  HEENT: Normocephalic;  conjunctivae and sclerae clear;   NECK : supple  CHEST/LUNG: Clear to auscultation bilaterally with good air entry   HEART: S1 S2  regular; no murmurs, gallops or rubs  ABDOMEN: Soft, Nontender, Nondistended; Bowel sounds present  EXTREMITIES: no cyanosis; no edema; no calf tenderness  SKIN: warm and dry; no rash  NERVOUS SYSTEM:  Awake and alert; Oriented  to place, person and time  _________________________________________________  LABS:                        14.5   10. )-----------( 141      ( 21 Mar 2023 06:05 )             43.8     03-21    137  |  107  |  17  ----------------------------<  121<H>  4.1   |  27  |  0.98    Ca    9.3      21 Mar 2023 06:05  Phos  2.2     03-20  Mg     2.5     03-20    TPro  7.2  /  Alb  3.4<L>  /  TBili  0.9  /  DBili  x   /  AST  18  /  ALT  39  /  AlkPhos  78  03-20    PT/INR - ( 19 Mar 2023 11:05 )   PT: 14.1 sec;   INR: 1.18 ratio         PTT - ( 19 Mar 2023 11:05 )  PTT:28.6 sec  Urinalysis Basic - ( 19 Mar 2023 11:05 )    Color: Yellow / Appearance: Clear / S.010 / pH: x  Gluc: x / Ketone: Negative  / Bili: Negative / Urobili: Negative   Blood: x / Protein: 30 mg/dL / Nitrite: Negative   Leuk Esterase: Negative / RBC: 2-5 /HPF / WBC 0-2 /HPF   Sq Epi: x / Non Sq Epi: Occasional /HPF / Bacteria: Trace /HPF      CAPILLARY BLOOD GLUCOSE    RADIOLOGY & ADDITIONAL TESTS: < from: CT Lumbar Spine No Cont (23 @ 15:44) >    IMPRESSION:  No evidence of an acute lumbar spine fracture.  Scoliosis with degenerative changes greatest at L3-4 through L5-S1.   Please see report for detail.  Few small nonspecific sclerotic foci within the iliac bones. Correlate   with bone scan if metastatic disease is of clinical concern.    --- End of Report ---    < end of copied text >  < from: Xray Chest 1 View- PORTABLE-Urgent (23 @ 14:21) >    IMPRESSION: No acute finding or change.    --- End of Report ---    < end of copied text >      Imaging Personally Reviewed:  YES    Consultant(s) Notes Reviewed:   YES    Care Discussed with Consultants: JORGE Ball      Plan of care was discussed with patient and /or primary care giver; all questions and concerns were addressed and care was aligned with patient's wishes.     NP Note discussed with  primary attending    Patient is a 73y old  Male who presents with a chief complaint of fever of unknown origin (20 Mar 2023 17:16)      INTERVAL HPI/OVERNIGHT EVENTS: + febrile overnight     MEDICATIONS  (STANDING):  ampicillin/sulbactam  IVPB 3 Gram(s) IV Intermittent every 6 hours  aspirin enteric coated 81 milliGRAM(s) Oral daily  atorvastatin 40 milliGRAM(s) Oral at bedtime    MEDICATIONS  (PRN):      __________________________________________________  REVIEW OF SYSTEMS:    CONSTITUTIONAL: +fever,   EYES: no acute visual disturbances  NECK: No pain or stiffness  RESPIRATORY: No cough; No shortness of breath  CARDIOVASCULAR: No chest pain, no palpitations  GASTROINTESTINAL: No pain. No nausea or vomiting; No diarrhea   NEUROLOGICAL: No headache or numbness, no tremors  MUSCULOSKELETAL: No joint pain, no muscle pain  GENITOURINARY: no dysuria, no frequency, no hesitancy  PSYCHIATRY: no depression , no anxiety  ALL OTHER  ROS negative        Vital Signs Last 24 Hrs  T(C): 36.9 (21 Mar 2023 05:44), Max: 37.1 (20 Mar 2023 20:47)  T(F): 98.5 (21 Mar 2023 05:44), Max: 98.8 (20 Mar 2023 20:47)  HR: 84 (21 Mar 2023 05:44) (84 - 106)  BP: 114/75 (21 Mar 2023 05:44) (114/75 - 135/71)  BP(mean): 77 (20 Mar 2023 20:47) (77 - 84)  RR: 18 (21 Mar 2023 05:44) (16 - 18)  SpO2: 95% (21 Mar 2023 05:44) (95% - 97%)    Parameters below as of 21 Mar 2023 05:44  Patient On (Oxygen Delivery Method): room air        ________________________________________________  PHYSICAL EXAM:  GENERAL: NAD  HEENT: Normocephalic;  conjunctivae and sclerae clear; + red blister to upper and lower lip   NECK : supple  CHEST/LUNG: Clear to auscultation bilaterally with good air entry   HEART: S1 S2  regular; no murmurs, gallops or rubs  ABDOMEN: Soft, Nontender, Nondistended; Bowel sounds present  EXTREMITIES: no cyanosis; no edema; no calf tenderness  SKIN: warm and dry; no rash  NERVOUS SYSTEM:  Awake and alert; Oriented  to place, person and time  _________________________________________________  LABS:                        14.5   10. )-----------( 141      ( 21 Mar 2023 06:05 )             43.8     -    137  |  107  |  17  ----------------------------<  121<H>  4.1   |  27  |  0.98    Ca    9.3      21 Mar 2023 06:05  Phos  2.2     03-20  Mg     2.5     -20    TPro  7.2  /  Alb  3.4<L>  /  TBili  0.9  /  DBili  x   /  AST  18  /  ALT  39  /  AlkPhos  78  03-20    PT/INR - ( 19 Mar 2023 11:05 )   PT: 14.1 sec;   INR: 1.18 ratio         PTT - ( 19 Mar 2023 11:05 )  PTT:28.6 sec  Urinalysis Basic - ( 19 Mar 2023 11:05 )    Color: Yellow / Appearance: Clear / S.010 / pH: x  Gluc: x / Ketone: Negative  / Bili: Negative / Urobili: Negative   Blood: x / Protein: 30 mg/dL / Nitrite: Negative   Leuk Esterase: Negative / RBC: 2-5 /HPF / WBC 0-2 /HPF   Sq Epi: x / Non Sq Epi: Occasional /HPF / Bacteria: Trace /HPF      CAPILLARY BLOOD GLUCOSE    RADIOLOGY & ADDITIONAL TESTS: < from: CT Lumbar Spine No Cont (23 @ 15:44) >    IMPRESSION:  No evidence of an acute lumbar spine fracture.  Scoliosis with degenerative changes greatest at L3-4 through L5-S1.   Please see report for detail.  Few small nonspecific sclerotic foci within the iliac bones. Correlate   with bone scan if metastatic disease is of clinical concern.    --- End of Report ---    < end of copied text >  < from: Xray Chest 1 View- PORTABLE-Urgent (23 @ 14:21) >    IMPRESSION: No acute finding or change.    --- End of Report ---    < end of copied text >      Imaging Personally Reviewed:  YES    Consultant(s) Notes Reviewed:   YES    Care Discussed with Consultants: JORGE Ball      Plan of care was discussed with patient and /or primary care giver; all questions and concerns were addressed and care was aligned with patient's wishes.

## 2023-03-21 NOTE — PROGRESS NOTE ADULT - PROBLEM SELECTOR PLAN 1
p/w throat pain   culture + for group a strep  started on zosyn to cover   ID Quinn
p/w throat pain   culture + for group a strep  abx changed to Unasyn   ID Quinn

## 2023-03-21 NOTE — PROGRESS NOTE ADULT - PROBLEM SELECTOR PLAN 5
IMPROVE VTE Individual Risk Assessment          RISK                                                          Points  [  ] Previous VTE                                                3  [  ] Thrombophilia                                             2  [  ] Lower limb paralysis                                   2        (unable to hold up >15 seconds)    [  ] Current Cancer                                             2         (within 6 months)  [ x ] Immobilization > 24 hrs                              1  [  ] ICU/CCU stay > 24 hours                             1  [ x ] Age > 60                                                         1    IMPROVE VTE Score:         [     2    ]      Lovenox 40mg history of prostate removal  Lspine CT with concern for sclerotic lesions on pelvic   pending bone scan to r/o mets

## 2023-03-21 NOTE — PROGRESS NOTE ADULT - ASSESSMENT
73 year old male with PMH of prostate CA HLD presents with generalized weakness and throat pain. Patient was found to be septic throat culture + strep A. Started on IV Zosyn, f/u blood and urine cultures. Plan of care discussed with daughter at bedside who requests CT scan of Lspine as he has been complaining of nontraumatic pain for months   
73 year old male with PMH of prostate CA HLD presents with generalized weakness and throat pain. Patient was found to be septic throat culture + strep A. Started on IV Zosyn, f/u blood and urine cultures. Plan of care discussed with daughter at bedside who requests CT scan of Lspine which found no acute pathology. Noted febrile overnight, ABX changed to Unasyn
Tonsillitis (Bilat)  Fevers - improved  Leukocytosis - normalized  Herpes simplex of lips      Plan - on Unasyn 3 gms iv q6 hrs  Can DC home today on Amoxicillin 500mgs po TID x 8 days and   Valtrex 500mgs po BID x 5 days.  reconsult prn.

## 2023-03-21 NOTE — PROGRESS NOTE ADULT - PROBLEM SELECTOR PLAN 7
patient from home   febrile overnight   pending final blood cultures   remains on IV Unasyn   pending bone scan  will need to be afebrile for 24 hours prior to D/C

## 2023-03-21 NOTE — PROGRESS NOTE ADULT - PROBLEM SELECTOR PLAN 3
pt takes atorvastatin at home  c/w home meds septic on admission   WBC 20  febrile overnight   continue Zosyn  CXR clear, UA negative  f/u final blood cultures  ID Quinn santos

## 2023-03-21 NOTE — PROGRESS NOTE ADULT - SUBJECTIVE AND OBJECTIVE BOX
73y Male who is doing much better , his fevers have improved and his WBC count has normalized, he states that his sore throat is improving also, his throat culture shows Grp A Strep. He does c/o of some ulcers on his lips and inner mouth.    Meds:  ampicillin/sulbactam  IVPB 3 Gram(s) IV Intermittent every 6 hours    Allergies    No Known Allergies    Intolerances        VITALS:  Vital Signs Last 24 Hrs  T(C): 36.8 (21 Mar 2023 12:55), Max: 37.1 (20 Mar 2023 20:47)  T(F): 98.3 (21 Mar 2023 12:55), Max: 98.8 (20 Mar 2023 20:47)  HR: 86 (21 Mar 2023 12:55) (84 - 101)  BP: 126/65 (21 Mar 2023 12:55) (114/75 - 126/65)  BP(mean): 80 (21 Mar 2023 12:55) (77 - 80)  RR: 16 (21 Mar 2023 12:55) (16 - 18)  SpO2: 97% (21 Mar 2023 12:55) (95% - 97%)    Parameters below as of 21 Mar 2023 12:55  Patient On (Oxygen Delivery Method): room air        LABS/DIAGNOSTIC TESTS:  Strep Throat by PCR: Grp A Detected Strep Throat PCR assay                        14.5   10.19 )-----------( 141      ( 21 Mar 2023 06:05 )             43.8         03-21    137  |  107  |  17  ----------------------------<  121<H>  4.1   |  27  |  0.98    Ca    9.3      21 Mar 2023 06:05  Phos  2.2     03-20  Mg     2.5     03-20    TPro  7.2  /  Alb  3.4<L>  /  TBili  0.9  /  DBili  x   /  AST  18  /  ALT  39  /  AlkPhos  78  03-20      LIVER FUNCTIONS - ( 20 Mar 2023 06:55 )  Alb: 3.4 g/dL / Pro: 7.2 g/dL / ALK PHOS: 78 U/L / ALT: 39 U/L DA / AST: 18 U/L / GGT: x             CULTURES: .Blood Blood-Peripheral  03-19 @ 11:10   No growth to date.  --  --      .Blood Blood-Peripheral  03-19 @ 11:05   No growth to date.  --  --            RADIOLOGY:      ROS:  [  ] UNABLE TO ELICIT

## 2023-03-21 NOTE — PROGRESS NOTE ADULT - PROBLEM SELECTOR PLAN 2
septic on admission   WBC 20  febrile overnight   continue Zosyn  CXR clear, UA negative  f/u final blood cultures  ID Quinn santos to the lip   started on valrex   standard precautions

## 2023-03-22 ENCOUNTER — TRANSCRIPTION ENCOUNTER (OUTPATIENT)
Age: 73
End: 2023-03-22

## 2023-03-22 VITALS
HEART RATE: 87 BPM | SYSTOLIC BLOOD PRESSURE: 130 MMHG | TEMPERATURE: 97 F | DIASTOLIC BLOOD PRESSURE: 78 MMHG | RESPIRATION RATE: 16 BRPM | OXYGEN SATURATION: 97 %

## 2023-03-22 LAB
ANION GAP SERPL CALC-SCNC: 4 MMOL/L — LOW (ref 5–17)
BUN SERPL-MCNC: 15 MG/DL — SIGNIFICANT CHANGE UP (ref 7–18)
CALCIUM SERPL-MCNC: 9.2 MG/DL — SIGNIFICANT CHANGE UP (ref 8.4–10.5)
CHLORIDE SERPL-SCNC: 107 MMOL/L — SIGNIFICANT CHANGE UP (ref 96–108)
CO2 SERPL-SCNC: 29 MMOL/L — SIGNIFICANT CHANGE UP (ref 22–31)
CREAT SERPL-MCNC: 0.86 MG/DL — SIGNIFICANT CHANGE UP (ref 0.5–1.3)
EGFR: 93 ML/MIN/1.73M2 — SIGNIFICANT CHANGE UP
GLUCOSE SERPL-MCNC: 114 MG/DL — HIGH (ref 70–99)
HCT VFR BLD CALC: 40.2 % — SIGNIFICANT CHANGE UP (ref 39–50)
HGB BLD-MCNC: 13.1 G/DL — SIGNIFICANT CHANGE UP (ref 13–17)
MCHC RBC-ENTMCNC: 29.8 PG — SIGNIFICANT CHANGE UP (ref 27–34)
MCHC RBC-ENTMCNC: 32.6 GM/DL — SIGNIFICANT CHANGE UP (ref 32–36)
MCV RBC AUTO: 91.4 FL — SIGNIFICANT CHANGE UP (ref 80–100)
NRBC # BLD: 0 /100 WBCS — SIGNIFICANT CHANGE UP (ref 0–0)
PLATELET # BLD AUTO: 168 K/UL — SIGNIFICANT CHANGE UP (ref 150–400)
POTASSIUM SERPL-MCNC: 3.8 MMOL/L — SIGNIFICANT CHANGE UP (ref 3.5–5.3)
POTASSIUM SERPL-SCNC: 3.8 MMOL/L — SIGNIFICANT CHANGE UP (ref 3.5–5.3)
RBC # BLD: 4.4 M/UL — SIGNIFICANT CHANGE UP (ref 4.2–5.8)
RBC # FLD: 13.1 % — SIGNIFICANT CHANGE UP (ref 10.3–14.5)
SODIUM SERPL-SCNC: 140 MMOL/L — SIGNIFICANT CHANGE UP (ref 135–145)
WBC # BLD: 7.33 K/UL — SIGNIFICANT CHANGE UP (ref 3.8–10.5)
WBC # FLD AUTO: 7.33 K/UL — SIGNIFICANT CHANGE UP (ref 3.8–10.5)

## 2023-03-22 PROCEDURE — 87798 DETECT AGENT NOS DNA AMP: CPT

## 2023-03-22 PROCEDURE — 99285 EMERGENCY DEPT VISIT HI MDM: CPT

## 2023-03-22 PROCEDURE — 72131 CT LUMBAR SPINE W/O DYE: CPT

## 2023-03-22 PROCEDURE — 80048 BASIC METABOLIC PNL TOTAL CA: CPT

## 2023-03-22 PROCEDURE — 0225U NFCT DS DNA&RNA 21 SARSCOV2: CPT

## 2023-03-22 PROCEDURE — 87086 URINE CULTURE/COLONY COUNT: CPT

## 2023-03-22 PROCEDURE — 78306 BONE IMAGING WHOLE BODY: CPT

## 2023-03-22 PROCEDURE — 96361 HYDRATE IV INFUSION ADD-ON: CPT

## 2023-03-22 PROCEDURE — 85610 PROTHROMBIN TIME: CPT

## 2023-03-22 PROCEDURE — A9503: CPT

## 2023-03-22 PROCEDURE — 87651 STREP A DNA AMP PROBE: CPT

## 2023-03-22 PROCEDURE — 84100 ASSAY OF PHOSPHORUS: CPT

## 2023-03-22 PROCEDURE — 82962 GLUCOSE BLOOD TEST: CPT

## 2023-03-22 PROCEDURE — 85027 COMPLETE CBC AUTOMATED: CPT

## 2023-03-22 PROCEDURE — 78306 BONE IMAGING WHOLE BODY: CPT | Mod: 26

## 2023-03-22 PROCEDURE — 86803 HEPATITIS C AB TEST: CPT

## 2023-03-22 PROCEDURE — 71045 X-RAY EXAM CHEST 1 VIEW: CPT

## 2023-03-22 PROCEDURE — 85025 COMPLETE CBC W/AUTO DIFF WBC: CPT

## 2023-03-22 PROCEDURE — 80053 COMPREHEN METABOLIC PANEL: CPT

## 2023-03-22 PROCEDURE — 83605 ASSAY OF LACTIC ACID: CPT

## 2023-03-22 PROCEDURE — 93005 ELECTROCARDIOGRAM TRACING: CPT

## 2023-03-22 PROCEDURE — 96374 THER/PROPH/DIAG INJ IV PUSH: CPT

## 2023-03-22 PROCEDURE — 87040 BLOOD CULTURE FOR BACTERIA: CPT

## 2023-03-22 PROCEDURE — 81001 URINALYSIS AUTO W/SCOPE: CPT

## 2023-03-22 PROCEDURE — 85730 THROMBOPLASTIN TIME PARTIAL: CPT

## 2023-03-22 PROCEDURE — 83735 ASSAY OF MAGNESIUM: CPT

## 2023-03-22 PROCEDURE — 36415 COLL VENOUS BLD VENIPUNCTURE: CPT

## 2023-03-22 RX ORDER — AMPICILLIN TRIHYDRATE 250 MG
1 CAPSULE ORAL
Qty: 14 | Refills: 0
Start: 2023-03-22 | End: 2023-03-28

## 2023-03-22 RX ORDER — ACETAMINOPHEN 500 MG
2 TABLET ORAL
Qty: 0 | Refills: 0 | DISCHARGE
Start: 2023-03-22

## 2023-03-22 RX ORDER — VALACYCLOVIR 500 MG/1
1 TABLET, FILM COATED ORAL
Qty: 8 | Refills: 0
Start: 2023-03-22 | End: 2023-03-25

## 2023-03-22 RX ADMIN — Medication 81 MILLIGRAM(S): at 11:10

## 2023-03-22 RX ADMIN — VALACYCLOVIR 500 MILLIGRAM(S): 500 TABLET, FILM COATED ORAL at 05:42

## 2023-03-22 RX ADMIN — AMPICILLIN SODIUM AND SULBACTAM SODIUM 200 GRAM(S): 250; 125 INJECTION, POWDER, FOR SUSPENSION INTRAMUSCULAR; INTRAVENOUS at 05:42

## 2023-03-22 RX ADMIN — AMPICILLIN SODIUM AND SULBACTAM SODIUM 200 GRAM(S): 250; 125 INJECTION, POWDER, FOR SUSPENSION INTRAMUSCULAR; INTRAVENOUS at 11:10

## 2023-03-22 NOTE — DISCHARGE NOTE PROVIDER - NSFOLLOWUPCLINICS_GEN_ALL_ED_FT
[facility]  Orthopedics  [address] [place] [address]  Phone: [phone]  Fax: [phone]  Follow Up Time: Routine

## 2023-03-22 NOTE — DISCHARGE NOTE PROVIDER - NSDCMRMEDTOKEN_GEN_ALL_CORE_FT
Aspir 81 oral delayed release tablet: 1 tab(s) orally once a day  atorvastatin 40 mg oral tablet: 1 tab(s) orally once a day   acetaminophen 325 mg oral tablet: 2 tab(s) orally every 6 hours, As needed, Mild Pain (1 - 3)  ampicillin 500 mg oral capsule: 1 cap(s) orally 2 times a day   Aspir 81 oral delayed release tablet: 1 tab(s) orally once a day  atorvastatin 40 mg oral tablet: 1 tab(s) orally once a day  valACYclovir 500 mg oral tablet: 1 tab(s) orally 2 times a day

## 2023-03-22 NOTE — DISCHARGE NOTE PROVIDER - HOSPITAL COURSE
73 year old male with PMH of prostate CA HLD presents with generalized weakness and throat pain. Patient was found to be septic throat culture + strep A. Started on IV Zosyn, f/u blood and urine cultures. Plan of care discussed with daughter at bedside who requests CT scan of Lspine which found concern for  "Few small nonspecific sclerotic foci within the iliac bones". Given history of prostate CA bone scan obtained to R/O -*-  *-*-* Noted with + Herpes to the lip, started on Xarelto   Given clinical course decision made to discharge patient home with outpatient follow up   Discharge discussed with attending  This is only a brief summary of patient's hospital stay, for full course please see EMR 73 year old male with PMH of prostate CA HLD presents with generalized weakness and throat pain. Patient was found to be septic throat culture + strep A. Started on IV Zosyn, f/u blood and urine cultures. Plan of care discussed with daughter at bedside who requests CT scan of Lspine which found concern for  "Few small nonspecific sclerotic foci within the iliac bones". Given history of prostate CA bone scan obtained which was negative   for osseuos metastasis. + Herpes to the lip, started on valtrex  Given clinical course decision made to discharge patient home with outpatient follow up   Discharge discussed with attending  This is only a brief summary of patient's hospital stay, for full course please see EMR

## 2023-03-22 NOTE — DISCHARGE NOTE PROVIDER - NSDCCPCAREPLAN_GEN_ALL_CORE_FT
PRINCIPAL DISCHARGE DIAGNOSIS  Diagnosis: Streptococcus infection  Assessment and Plan of Treatment: Strep throat is an infection of the throat caused by bacteria called Streptococci. There are different types of streptococci. The type that causes serious sore throats and needs to be treated with antibiotics is called group A strep. Follow the full treatment prescribed by your health care provider. For a sore throat: Make sure you have enough fluids. Drink clear soup, cold drinks, and other clear, nutritious liquids. If eating  hurts your throat, don't force yourself to eat solid food. When you are able to eat more foods, choose healthy food to give you strength and to help fight the infection. Do not smoke. Do not breathe second-hand smoke. Gargle with salt water. Suck on lozenges or hard candy.  Don't talk a lot. Rest your voice. Use a humidifier or vaporizer to add moisture to the air. Put warm compresses on your neck. If you have a fever, rest and limit your activities until the fever is gone. Ask your health care provider if you can take aspirin, acetaminophen, or ibuprofen to reduce your fever and to relieve pain.      SECONDARY DISCHARGE DIAGNOSES  Diagnosis: Sepsis  Assessment and Plan of Treatment: You presented to the hospital with fever this is likely secondary to your strep throat infection. You were treated with intravenous antibiotics and followed by the infectious disease doctor. You are reccomended to continue 7 more days of amoxicillin    Diagnosis: HSV-1 infection  Assessment and Plan of Treatment: Herpes is a common viral infection. It does not have a cure, but medication can be used to treat and/or decrease outbreaks. With time, outbreaks usually get less frequent and less severe. There are 2 types of herpes simplex virus: HSV-1 and HSV-2. HSV-1 has traditionally been associated with oral cold sores. Oral herpes symptoms include open sores or blisters around the mouth. Sores can be tender or painful and can take 2 to 4 weeks to clear up. You may experience itching or burning around your mouth before sores appear. There is no cure for herpes, but medications are available to help prevent or treat symptoms. Most medications are pills taken orally. Depending on the frequency of your outbreaks, your healthcare provider might prescribe:  Intermittent treatment: an antiviral drug taken when an outbreak occurs to treat symptoms Suppressive treatment: an antiviral drug usually taken daily to reduce the number of outbreaks. You are reccomended to continue valtrex for 4 more days    Diagnosis: HLD (hyperlipidemia)  Assessment and Plan of Treatment: Follow up with PCP for treatment goals, continue medication, have liver function testing every 3 months as anti lipid medications can cause liver irritation, eat low fat, low cholesterol meals      Diagnosis: Prostate CA  Assessment and Plan of Treatment: You have a history of prostate cancer, it is important to follow up routinely with your cancer team for continued screening of this condition

## 2023-03-22 NOTE — DISCHARGE NOTE PROVIDER - CARE PROVIDER_API CALL
GEE COHEN  Internal Medicine  [address] [place] [address]  [address]  [place] [address]  Phone: [phone]  Fax: [phone]  Follow Up Time: 1 week

## 2023-03-22 NOTE — DISCHARGE NOTE NURSING/CASE MANAGEMENT/SOCIAL WORK - PATIENT PORTAL LINK FT
You can access the FollowMyHealth Patient Portal offered by [facility] by registering at the following website: [email or web address]. By joining [employer]’s FollowMyHealth portal, you will also be able to view your health information using other applications (apps) compatible with our system.

## 2023-03-24 LAB
CULTURE RESULTS: SIGNIFICANT CHANGE UP
CULTURE RESULTS: SIGNIFICANT CHANGE UP
SPECIMEN SOURCE: SIGNIFICANT CHANGE UP
SPECIMEN SOURCE: SIGNIFICANT CHANGE UP

## 2023-08-21 NOTE — PHYSICAL EXAM
-- DO NOT REPLY / DO NOT REPLY ALL --  -- Message is from [employer] ([employer]) --    General Patient Message: Patient is calling doctor because her employer still has not revcieved the paperwork and this is holding up her pay. Please call so she can .    Caller Information       Type Contact Phone/Fax    08/21/2023 01:56 PM CDT Phone (Incoming) Sanam Sandhu (Self) [phone] (M)        Alternative phone number: None    Can a detailed message be left? Yes    Message Turnaround:     Is it Working Hours? Yes - Working Hours     [place]:    Please give this turnaround time to the caller:   \"This message will be sent to [state Provider's name]. The clinical team will fulfill your request as soon as they review your message.\"                 [Oriented to Person] : oriented to person [Alert] : alert [Oriented to Time] : oriented to time [Oriented to Place] : oriented to place [Calm] : calm [de-identified] : He  is alert, well-groomed, and cheerful.\par   [de-identified] :  Neck supple. Trachea midline. Thyroid isthmus barely palpable, lobes not felt.\par   [de-identified] :   anicteric.  Nasal mucosa pink, septum midline. Oral mucosa pink.  Tongue midline, Pharynx without exudates.\par   [de-identified] :  Small  reducible BL  inguinal hernia.  Left is larger than the right.    left flank and left hip ecchymosis.    No penile discharge or lesions.  No scrotal swelling or discoloration. Testes descended bilaterally, smooth, without masses. Epididymis non-tender.

## 2023-08-28 ENCOUNTER — NON-APPOINTMENT (OUTPATIENT)
Age: 73
End: 2023-08-28

## 2023-09-01 ENCOUNTER — APPOINTMENT (OUTPATIENT)
Dept: UROLOGY | Facility: CLINIC | Age: 73
End: 2023-09-01
Payer: MEDICARE

## 2023-09-01 VITALS
DIASTOLIC BLOOD PRESSURE: 85 MMHG | SYSTOLIC BLOOD PRESSURE: 165 MMHG | WEIGHT: 180 LBS | HEART RATE: 58 BPM | HEIGHT: 66 IN | BODY MASS INDEX: 28.93 KG/M2 | TEMPERATURE: 97.9 F

## 2023-09-01 DIAGNOSIS — R97.20 ELEVATED PROSTATE, SPECIFIC ANTIGEN [PSA]: ICD-10-CM

## 2023-09-01 PROCEDURE — 99214 OFFICE O/P EST MOD 30 MIN: CPT

## 2023-09-01 NOTE — HISTORY OF PRESENT ILLNESS
[FreeTextEntry1] : returns for followup pt of rosalva s/p RALP 4/2021 jarret 3+4 tertiary 5 +3mm positive margins undetectable PSAs incontinence improving using sildenafil 100

## 2023-09-05 LAB — PSA SERPL-MCNC: <0.01 NG/ML

## 2023-10-02 NOTE — HISTORY OF PRESENT ILLNESS
Writer able to connect with the patient to verify correct address test kit is to be shipped.    A [facility] order was placed at [employer]. A testkit with instructions will be sent to the patient via FedEx including return FedEx envelope.     A MTM is to be ordered upon receipt of results.Bianka Paiz LPN Lead     [FreeTextEntry1] : 71 y/o w/hx of varicose veins LLE SSV reflux s/p RFA presents for follow up evaluation. Patient reports he continues to experience LLE discomfort most at night after activities. He notice residual varicose veins bulge, feel achy and heavy. He has tried using compression stockings and OTC with minimum improvement. Denies any skin breakdown, skin discoloration,  fever or chills.

## 2024-01-11 ENCOUNTER — APPOINTMENT (OUTPATIENT)
Dept: UROLOGY | Facility: CLINIC | Age: 74
End: 2024-01-11
Payer: MEDICARE

## 2024-01-11 VITALS
TEMPERATURE: 98.2 F | DIASTOLIC BLOOD PRESSURE: 79 MMHG | HEART RATE: 76 BPM | BODY MASS INDEX: 29.57 KG/M2 | SYSTOLIC BLOOD PRESSURE: 134 MMHG | WEIGHT: 184 LBS | HEIGHT: 66 IN

## 2024-01-11 DIAGNOSIS — C61 MALIGNANT NEOPLASM OF PROSTATE: ICD-10-CM

## 2024-01-11 PROCEDURE — 99214 OFFICE O/P EST MOD 30 MIN: CPT

## 2024-01-11 RX ORDER — SILDENAFIL 100 MG/1
100 TABLET, FILM COATED ORAL
Qty: 6 | Refills: 11 | Status: ACTIVE | COMMUNITY
Start: 2021-09-13 | End: 1900-01-01

## 2024-01-11 NOTE — END OF VISIT
[FreeTextEntry3] : I, Dr. Cowan, personally performed the evaluation and management (E/M) services for this established patient who presents today with (a) new problem(s)/exacerbation of (an) existing condition(s). That E/M includes conducting the clinically appropriate interval history &/or exam, assessing all new/exacerbated conditions, and establishing a new plan of care. Today, my LACEY, Kavon Barcenas, was here to observe my evaluation and management service for this new problem/exacerbated condition and follow the plan of care established by me going forward

## 2024-01-11 NOTE — ASSESSMENT
[FreeTextEntry1] : 73M here  for follow up with ED,  hx of CaP jarret 3+4, Tertiary 5 +3 mm pos margins  Hx of CaP - last psa 9/2023 undetectable - repeat today - repeat in 6 months - UA, UCx today   ED - continue sildenafil - difficulty with insurance  - reviewed fluid restriction

## 2024-01-11 NOTE — PHYSICAL EXAM
[Normal Appearance] : normal appearance [General Appearance - In No Acute Distress] : no acute distress [Edema] : no peripheral edema [] : no respiratory distress [Exaggerated Use Of Accessory Muscles For Inspiration] : no accessory muscle use [Abdomen Tenderness] : non-tender [Normal Station and Gait] : the gait and station were normal for the patient's age [Oriented To Time, Place, And Person] : oriented to person, place, and time [Affect] : the affect was normal

## 2024-01-11 NOTE — HISTORY OF PRESENT ILLNESS
[FreeTextEntry1] : 73M here for follow up with ED,  hx of CaP jarret 3+4, Tertiary 5 +3 mm pos margins  - Last PSA : 9/2023 <0.01 - on sildenafil 100 mg, changed insurance and no longer covering.  - nocturia x 3, some stress incontinence with heavy lifting, coughing and sneezing  - Denies: fever chills flank pain dysuria hematuria

## 2024-01-12 LAB
PSA FREE FLD-MCNC: NORMAL %
PSA FREE SERPL-MCNC: <0.01 NG/ML
PSA SERPL-MCNC: <0.01 NG/ML

## 2024-01-16 LAB
APPEARANCE: CLEAR
BACTERIA UR CULT: NORMAL
BACTERIA: NEGATIVE /HPF
BILIRUBIN URINE: NEGATIVE
BLOOD URINE: NEGATIVE
CAST: 0 /LPF
COLOR: YELLOW
EPITHELIAL CELLS: 0 /HPF
GLUCOSE QUALITATIVE U: NEGATIVE MG/DL
KETONES URINE: NEGATIVE MG/DL
LEUKOCYTE ESTERASE URINE: NEGATIVE
MICROSCOPIC-UA: NORMAL
NITRITE URINE: NEGATIVE
PH URINE: 7
PROTEIN URINE: NEGATIVE MG/DL
RED BLOOD CELLS URINE: 2 /HPF
SPECIFIC GRAVITY URINE: 1.02
UROBILINOGEN URINE: 0.2 MG/DL
WHITE BLOOD CELLS URINE: 0 /HPF

## 2024-01-23 ENCOUNTER — NON-APPOINTMENT (OUTPATIENT)
Age: 74
End: 2024-01-23

## 2024-02-05 ENCOUNTER — APPOINTMENT (OUTPATIENT)
Dept: UROLOGY | Facility: CLINIC | Age: 74
End: 2024-02-05

## 2024-05-22 NOTE — ED ADULT TRIAGE NOTE - HEART RATE (BEATS/MIN)
111 normal external genitalia/no hernia/no discharge/no mass/no tenderness/no ulcer/normal/no penile lesion/no palpable testicular mass/no scrotal mass

## 2024-07-19 ENCOUNTER — NON-APPOINTMENT (OUTPATIENT)
Age: 74
End: 2024-07-19

## 2024-07-19 ENCOUNTER — APPOINTMENT (OUTPATIENT)
Dept: UROLOGY | Facility: CLINIC | Age: 74
End: 2024-07-19
Payer: MEDICARE

## 2024-07-19 VITALS
DIASTOLIC BLOOD PRESSURE: 77 MMHG | SYSTOLIC BLOOD PRESSURE: 151 MMHG | TEMPERATURE: 98.2 F | WEIGHT: 184 LBS | HEART RATE: 78 BPM | HEIGHT: 66 IN | BODY MASS INDEX: 29.57 KG/M2

## 2024-07-19 DIAGNOSIS — C61 MALIGNANT NEOPLASM OF PROSTATE: ICD-10-CM

## 2024-07-19 DIAGNOSIS — N48.1 BALANITIS: ICD-10-CM

## 2024-07-19 PROCEDURE — 99214 OFFICE O/P EST MOD 30 MIN: CPT

## 2024-07-19 PROCEDURE — G2211 COMPLEX E/M VISIT ADD ON: CPT

## 2024-07-19 RX ORDER — CLOTRIMAZOLE AND BETAMETHASONE DIPROPIONATE 10; .5 MG/G; MG/G
1-0.05 CREAM TOPICAL
Qty: 45 | Refills: 1 | Status: ACTIVE | COMMUNITY
Start: 2024-07-19 | End: 1900-01-01

## 2024-07-22 LAB
APPEARANCE: CLEAR
BACTERIA UR CULT: NORMAL
BILIRUBIN URINE: NEGATIVE
BLOOD URINE: NEGATIVE
COLOR: YELLOW
GLUCOSE QUALITATIVE U: NEGATIVE MG/DL
KETONES URINE: NEGATIVE MG/DL
LEUKOCYTE ESTERASE URINE: NEGATIVE
NITRITE URINE: NEGATIVE
PH URINE: 6
PROTEIN URINE: NORMAL MG/DL
PSA SERPL-MCNC: <0.01 NG/ML
SPECIFIC GRAVITY URINE: 1.03
UROBILINOGEN URINE: 1 MG/DL

## 2025-01-17 ENCOUNTER — APPOINTMENT (OUTPATIENT)
Dept: UROLOGY | Facility: CLINIC | Age: 75
End: 2025-01-17
Payer: MEDICARE

## 2025-01-17 DIAGNOSIS — C61 MALIGNANT NEOPLASM OF PROSTATE: ICD-10-CM

## 2025-01-17 PROCEDURE — 99214 OFFICE O/P EST MOD 30 MIN: CPT

## 2025-01-17 PROCEDURE — G2211 COMPLEX E/M VISIT ADD ON: CPT

## 2025-01-17 RX ORDER — OXYBUTYNIN CHLORIDE 10 MG/1
10 TABLET, EXTENDED RELEASE ORAL
Qty: 30 | Refills: 1 | Status: ACTIVE | COMMUNITY
Start: 2025-01-17 | End: 1900-01-01

## 2025-01-17 RX ORDER — SILDENAFIL 100 MG/1
100 TABLET, FILM COATED ORAL
Qty: 6 | Refills: 11 | Status: ACTIVE | COMMUNITY
Start: 2025-01-17 | End: 1900-01-01

## 2025-01-22 LAB — PSA SERPL-MCNC: <0.01 NG/ML

## 2025-02-10 ENCOUNTER — RX CHANGE (OUTPATIENT)
Age: 75
End: 2025-02-10

## 2025-02-10 RX ORDER — OXYBUTYNIN CHLORIDE 10 MG/1
10 TABLET, EXTENDED RELEASE ORAL
Qty: 90 | Refills: 3 | Status: ACTIVE | COMMUNITY
Start: 1900-01-01 | End: 1900-01-01

## 2025-06-06 NOTE — DISCHARGE NOTE PROVIDER - PROVIDER TOKENS
[facility] [[email or web address]::\"[id number]}     PROVIDER:[TOKEN:[[id number]],FOLLOWUP:[1 week]]

## 2025-07-18 ENCOUNTER — APPOINTMENT (OUTPATIENT)
Dept: UROLOGY | Facility: CLINIC | Age: 75
End: 2025-07-18
Payer: MEDICARE

## 2025-07-18 ENCOUNTER — NON-APPOINTMENT (OUTPATIENT)
Age: 75
End: 2025-07-18

## 2025-07-18 VITALS
HEART RATE: 80 BPM | DIASTOLIC BLOOD PRESSURE: 79 MMHG | BODY MASS INDEX: 29.57 KG/M2 | HEIGHT: 66 IN | TEMPERATURE: 97.9 F | SYSTOLIC BLOOD PRESSURE: 122 MMHG | WEIGHT: 184 LBS

## 2025-07-18 PROCEDURE — G2211 COMPLEX E/M VISIT ADD ON: CPT

## 2025-07-18 PROCEDURE — 99214 OFFICE O/P EST MOD 30 MIN: CPT

## 2025-07-21 LAB
APPEARANCE: CLEAR
BACTERIA UR CULT: NORMAL
BACTERIA: NEGATIVE /HPF
BILIRUBIN URINE: NEGATIVE
BLOOD URINE: NEGATIVE
CAST: 0 /LPF
COLOR: YELLOW
EPITHELIAL CELLS: 0 /HPF
GLUCOSE QUALITATIVE U: NEGATIVE MG/DL
KETONES URINE: NEGATIVE MG/DL
LEUKOCYTE ESTERASE URINE: NEGATIVE
MICROSCOPIC-UA: NORMAL
NITRITE URINE: NEGATIVE
PH URINE: 5.5
PROTEIN URINE: NEGATIVE MG/DL
PSA SERPL-MCNC: <0.01 NG/ML
RED BLOOD CELLS URINE: 0 /HPF
SPECIFIC GRAVITY URINE: 1.02
UROBILINOGEN URINE: 0.2 MG/DL
WHITE BLOOD CELLS URINE: 0 /HPF